# Patient Record
Sex: MALE | Race: WHITE | NOT HISPANIC OR LATINO | Employment: OTHER | URBAN - METROPOLITAN AREA
[De-identification: names, ages, dates, MRNs, and addresses within clinical notes are randomized per-mention and may not be internally consistent; named-entity substitution may affect disease eponyms.]

---

## 2018-03-14 ENCOUNTER — HOSPITAL ENCOUNTER (OUTPATIENT)
Dept: CARDIOLOGY | Facility: CLINIC | Age: 78
Discharge: HOME | End: 2018-03-14
Attending: FAMILY MEDICINE

## 2018-03-14 DIAGNOSIS — R55 NEAR SYNCOPE: ICD-10-CM

## 2018-08-08 ENCOUNTER — RECORDS - HEALTHEAST (OUTPATIENT)
Dept: ADMINISTRATIVE | Facility: OTHER | Age: 78
End: 2018-08-08

## 2018-08-09 ENCOUNTER — RECORDS - HEALTHEAST (OUTPATIENT)
Dept: ADMINISTRATIVE | Facility: OTHER | Age: 78
End: 2018-08-09

## 2018-11-27 ENCOUNTER — SURGERY - HEALTHEAST (OUTPATIENT)
Dept: GASTROENTEROLOGY | Facility: HOSPITAL | Age: 78
End: 2018-11-27

## 2018-11-27 ASSESSMENT — MIFFLIN-ST. JEOR: SCORE: 1615.25

## 2019-03-18 ENCOUNTER — AMBULATORY - HEALTHEAST (OUTPATIENT)
Dept: CARDIOLOGY | Facility: CLINIC | Age: 79
End: 2019-03-18

## 2019-03-22 ENCOUNTER — AMBULATORY - HEALTHEAST (OUTPATIENT)
Dept: CARDIOLOGY | Facility: CLINIC | Age: 79
End: 2019-03-22

## 2019-03-27 ENCOUNTER — OFFICE VISIT - HEALTHEAST (OUTPATIENT)
Dept: CARDIOLOGY | Facility: CLINIC | Age: 79
End: 2019-03-27

## 2019-03-27 DIAGNOSIS — I25.10 CORONARY ARTERY DISEASE INVOLVING NATIVE CORONARY ARTERY OF NATIVE HEART WITHOUT ANGINA PECTORIS: ICD-10-CM

## 2019-03-27 DIAGNOSIS — I48.0 PAROXYSMAL ATRIAL FIBRILLATION (H): ICD-10-CM

## 2019-03-27 DIAGNOSIS — I49.3 PVC'S (PREMATURE VENTRICULAR CONTRACTIONS): ICD-10-CM

## 2019-03-27 DIAGNOSIS — I10 BENIGN ESSENTIAL HYPERTENSION: ICD-10-CM

## 2019-03-27 DIAGNOSIS — R07.9 CHEST PAIN, UNSPECIFIED TYPE: ICD-10-CM

## 2019-03-27 LAB
ATRIAL RATE - MUSE: 66 BPM
DIASTOLIC BLOOD PRESSURE - MUSE: NORMAL MMHG
INTERPRETATION ECG - MUSE: NORMAL
P AXIS - MUSE: 55 DEGREES
PR INTERVAL - MUSE: 160 MS
QRS DURATION - MUSE: 84 MS
QT - MUSE: 378 MS
QTC - MUSE: 396 MS
R AXIS - MUSE: -34 DEGREES
SYSTOLIC BLOOD PRESSURE - MUSE: NORMAL MMHG
T AXIS - MUSE: 14 DEGREES
VENTRICULAR RATE- MUSE: 66 BPM

## 2019-03-27 RX ORDER — ATORVASTATIN CALCIUM 40 MG/1
40 TABLET, FILM COATED ORAL DAILY
Refills: 3 | Status: SHIPPED | COMMUNITY
Start: 2019-01-07 | End: 2021-09-15

## 2019-03-27 ASSESSMENT — MIFFLIN-ST. JEOR: SCORE: 1669.69

## 2021-02-08 ENCOUNTER — AMBULATORY - HEALTHEAST (OUTPATIENT)
Dept: ADMINISTRATIVE | Facility: REHABILITATION | Age: 81
End: 2021-02-08

## 2021-02-08 DIAGNOSIS — I21.21 ST ELEVATION MYOCARDIAL INFARCTION INVOLVING LEFT CIRCUMFLEX CORONARY ARTERY (H): ICD-10-CM

## 2021-04-22 ENCOUNTER — AMBULATORY - HEALTHEAST (OUTPATIENT)
Dept: ADMINISTRATIVE | Facility: REHABILITATION | Age: 81
End: 2021-04-22

## 2021-04-22 DIAGNOSIS — I10 ESSENTIAL HYPERTENSION, BENIGN: ICD-10-CM

## 2021-04-22 DIAGNOSIS — Z95.5 STENTED CORONARY ARTERY: ICD-10-CM

## 2021-04-22 DIAGNOSIS — I25.2 OLD MYOCARDIAL INFARCTION: ICD-10-CM

## 2021-04-22 DIAGNOSIS — I25.10 CORONARY ARTERY DISEASE INVOLVING NATIVE CORONARY ARTERY OF NATIVE HEART WITHOUT ANGINA PECTORIS: ICD-10-CM

## 2021-04-22 DIAGNOSIS — F03.90 DEMENTIA WITHOUT BEHAVIORAL DISTURBANCE, UNSPECIFIED DEMENTIA TYPE: ICD-10-CM

## 2021-05-27 NOTE — PATIENT INSTRUCTIONS - HE
Below is a list of instructions we discussed today in clinic:   1. The pain in the muscle on your left chest wall is not coming from your heart.   2. Dr. Smith is doing a great job taking care of your heart.  3. Continue all medications as prescribed.  4. Follow up with Dr. Smith as scheduled.    It was a pleasure to meet with you today in clinic.  Please do not hesitate to call the [facility] clinic with any questions or concerns at [phone].    Sincerely,     Xavier Masters MD  Non-invasive Cardiology  Formerly [facility]

## 2021-06-02 VITALS — BODY MASS INDEX: 30.36 KG/M2 | HEIGHT: 69 IN | WEIGHT: 205 LBS

## 2021-06-02 VITALS — HEIGHT: 69 IN | BODY MASS INDEX: 32.14 KG/M2 | WEIGHT: 217 LBS

## 2021-06-16 PROBLEM — R55 PRE-SYNCOPE: Status: ACTIVE | Noted: 2018-03-05

## 2021-06-16 PROBLEM — R55 NEAR SYNCOPE: Status: ACTIVE | Noted: 2018-03-05

## 2021-06-27 NOTE — PROGRESS NOTES
Progress Notes by Xavier Masters MD at 3/27/2019 10:30 AM     Author: Xavier Masters MD Service: -- Author Type: Physician    Filed: 3/27/2019 11:21 AM Encounter Date: 3/27/2019 Status: Signed    : Xavier Masters MD (Physician)           Click to link to [facility]     [facility] HEART CARE NOTE    Thank you, Rolando Zee MD, for asking the [facility] team to see Mr. Patrick Green to evaluate Chest Pain.      Assessment/Recommendations   Assessment:    1. Musculoskeletal chest pain - not cardiac in etiology  2. Coronary artery disease - stable without angina.  3. Hypertension - stable  4. Frequent PVCs - treated with metoprolol. Stable.  5. Paroxysmal atrial fibrillation - no known recent recurrence    Plan:  1. I assured the patient that his chest pain is not anginal in nature. No additional cardiac testing is needed at this time.  2. He can follow up as previously recommended by and with Dr. Smith.    Primary Cardiologist: Dr. Smith ([facility]).         History of Present Illness   Mr. Patrick Green is a 79 y.o. male with a significant past history of coronary artery disease, hypertension, and hyperlipidemia who presents for evaluation of chest pain.     Mr. Green was originally diagnosed with coronary artery disease when he presented with a non-ST segment elevation MI in May 2014.  Angiography demonstrated multivessel coronary artery disease including 95% occlusion of the proximal circumflex, 80% occlusion of proximal ramus intermedius and 80% occlusion of the proximal LAD coronary arteries.  He underwent primary PCI of the circumflex and ramus coronary arteries and a staged PCI 2 months later of the proximal LAD.  He subsequently had recurrent chest pain and a stress test suggested significant ischemia of the inferior wall and ultimately the patient underwent PCI of the right coronary artery in October 2014.  His most recent ischemic evaluation was June 22 of 17  by a coronary angiogram that demonstrated widely patent stents and mild luminal irregularities elsewhere.  Patient also has a history of paroxysmal atrial fibrillation without known recurrence in quite some time.    He generally follows at the [facility] and was last seen in January of this year.  The patient presents today for chest pain.  He states that the pain is described as an ache it is located in his left axilla.  It is been present nearly constantly for about 6 months.  It is not exertional.  It is worse when he palpates the area of pain and squeezes the muscle where that pain is located.  He thinks it is muscular in nature but wanted a formal opinion to make sure it is not his heart.  He denies any anginal type pain and denies any exertional intolerance.    Other than noted above, Mr. Green denies any chest pain/pressure/tightness, shortness of breath at rest or with exertion, light headedness/dizziness, pre-syncope, syncope, lower extremity swelling, palpitations, paroxysmal nocturnal dyspnea (PND), or orthopnea.     Cardiac Problems and Cardiac Diagnostics     Most Recent Cardiac testing:  ECG dated 3/27/19 (personaly reviewed and interpreted): sinus rhythm, left axis deviation, occasional PVCs.    ECHO (report reviewed):   Echo results:   Results for orders placed during the hospital encounter of 03/05/18   Echo Complete [ECH10] 03/05/2018    Narrative   Left Ventricle: Normal left ventricular size and systolic function.The   calculated left ventricular ejection fraction is 65%. This represents a   normal ejection fraction. Borderline concentric hypertrophy noted. E/e' <   8, suggesting normal LV filling pressure.    The left ventricular wall motion is normal.    Right Ventricle: Normal right ventricular size and systolic function.   TAPSE is normal, which is consistent with normal right ventricular   systolic function.    Thoracic Aorta: The ascending aorta is mildly  dilated. No aneurysm   present.             Medications  Allergies   Current Outpatient Medications   Medication Sig Dispense Refill   ? acetaminophen (TYLENOL EXTRA STRENGTH) 500 MG tablet Take 1,000 mg by mouth every morning.      ? APPLE CIDER VINEGAR ORAL Take 1 tablet by mouth 2 (two) times a day.     ? aspirin 81 MG EC tablet Take 81 mg by mouth daily.     ? atorvastatin (LIPITOR) 40 MG tablet Take 40 mg by mouth daily.  3   ? DOCOSAHEXANOIC ACID/EPA (FISH OIL ORAL) Take 1,500 mg by mouth daily.     ? donepezil (ARICEPT) 10 MG tablet Take 10 mg by mouth every morning.      ? finasteride (PROSCAR) 5 mg tablet Take 5 mg by mouth daily.      ? lidocaine (LIDODERM) 5 % Remove & Discard patch within 12 hours or as directed by MD 15 patch 0   ? metoprolol succinate (TOPROL-XL) 25 MG Take 25 mg by mouth daily.      ? tamsulosin (FLOMAX) 0.4 mg Cp24 Take 0.4 mg by mouth daily after supper.      ? UNABLE TO FIND Take 1 tablet by mouth daily. Med Name: Honey, Garlic and Vinegar     ? vit C/E/Zn/coppr/lutein/zeaxan (PRESERVISION AREDS-2 ORAL) Take 1 tablet by mouth 2 (two) times a day.     ? atorvastatin (LIPITOR) 20 MG tablet Take 20 mg by mouth at bedtime.     ? coenzyme Q10 (COQ-10) 100 mg capsule Take 100 mg by mouth daily.      ? gluc-rakel-[facility]#[id number]-cathie-born 750-30-1,000-1 mg-mg-unit-mg Tab Take 1,500 mg by mouth daily.      ? memantine (NAMENDA) 10 MG tablet Take 10 mg by mouth daily.     ? nitroglycerin (NITROSTAT) 0.4 MG SL tablet Place 0.4 mg under the tongue.       No current facility-administered medications for this visit.       Allergies   Allergen Reactions   ? Diatrizoate Meglumine Other (See Comments) and Rash     Anaphylactic reaction during 5/17/14 coronary angiogram per patient    ? Simvastatin Other (See Comments)     Muscle weakness        Physical Examination Review of Systems   Vitals:    03/27/19 1032   BP: 106/68   Pulse: 68   Resp: 16     Body mass index is 32.05 kg/m .  Wt Readings from Last  3 Encounters:   03/27/19 217 lb (98.4 kg)   11/27/18 205 lb (93 kg)   03/06/18 210 lb 6.4 oz (95.4 kg)       General Appearance:   Pleasant male, appears stated age. no acute distress, overweight body habitus   ENT/Mouth: membranes moist, no apparent gingival bleeding.      EYES:  no scleral icterus, normal conjunctivae   Neck: no carotid bruits. No anterior cervical lymphadenopaty   Respiratory:   lungs are clear to auscultation, no rales or wheezing, equal chest wall expansion    Cardiovascular:   Regular rhythm, normal rate. Normal first and second heart sounds with no murmurs, rubs, or gallops; the carotid, radial and posterior tibial pulses are intact, Jugular venous pressure normal, no edema bilaterally    Abdomen/GI:  Soft, non-tender   Extremities: no cyanosis or clubbing   Skin: no xanthelasma, warm.    Heme/lymph/ Immunology No apparent bleeding noted.   Neurologic: Alert and oriented. normal gait, no tremors     Psychiatric: Pleasant, calm, appropriate affect.    A complete 10 system review of systems was performed and is negative except as mentioned in the HPI or below:  General: WNL  Eyes: WNL  Ears/Nose/Throat: WNL  Lungs: WNL  Heart: Chest Pain, Irregular Heartbeat  Stomach: WNL  Bladder: WNL  Muscle/Joints: WNL  Skin: WNL  Nervous System: WNL  Mental Health: WNL     Blood: WNL       Past History   Past Medical History:   Past Medical History:   Diagnosis Date   ? Abnormal nuclear stress test, low risk 12/9/2016    Small inferolateral ischemic defect per Dr. Blount; EF > 70%.   ? Acute non-ST elevation myocardial infarction (NSTEMI) (H) 5/17/2014   ? Chronic sinusitis 1/3/2008   ? Coronary artery disease due to calcified coronary lesion 2014    LAD and RCA stents on separate occasions at [place]   ? Dementia    ? Dyslipidemia, goal LDL below 70        Past Surgical History:   Past Surgical History:   Procedure Laterality Date   ? APPENDECTOMY     ? COLONOSCOPY N/A 11/27/2018    Procedure: COLONOSCOPY;   Surgeon: Kathryn Infante MD;  Location: [facility];  Service: Gastroenterology   ? CORONARY STENT PLACEMENT      LAD and RCA       Family History:   Family History   Problem Relation Age of Onset   ? Heart attack Father         Social History:   Social History     Socioeconomic History   ? Marital status:      Spouse name: Not on file   ? Number of children: Not on file   ? Years of education: Not on file   ? Highest education level: Not on file   Occupational History     Employer: RETIRED   Social Needs   ? Financial resource strain: Not on file   ? Food insecurity:     Worry: Not on file     Inability: Not on file   ? Transportation needs:     Medical: Not on file     Non-medical: Not on file   Tobacco Use   ? Smoking status: Former Smoker     Types: Cigarettes     Last attempt to quit: 2013     Years since quittin.2   ? Smokeless tobacco: Never Used   Substance and Sexual Activity   ? Alcohol use: Yes     Alcohol/week: 0.6 oz     Types: 1 Shots of liquor per week   ? Drug use: No     Comment: unable to assess   ? Sexual activity: Not on file   Lifestyle   ? Physical activity:     Days per week: Not on file     Minutes per session: Not on file   ? Stress: Not on file   Relationships   ? Social connections:     Talks on phone: Not on file     Gets together: Not on file     Attends [employer] service: Not on file     Active member of club or organization: Not on file     Attends meetings of clubs or organizations: Not on file     Relationship status: Not on file   ? Intimate partner violence:     Fear of current or ex partner: Not on file     Emotionally abused: Not on file     Physically abused: Not on file     Forced sexual activity: Not on file   Other Topics Concern   ? Not on file   Social History Narrative    He is from [place], [place], which is in the [facility] corner of [facility].              Lab Results    Chemistry/lipid CBC Cardiac Enzymes/BNP/TSH/INR   Lab Results   Component Value Date     CREATININE 1.15 03/05/2018    BUN 28 03/05/2018    K 4.4 03/05/2018     03/05/2018     (H) 03/05/2018    CO2 20 (L) 03/05/2018    Lab Results   Component Value Date    WBC 8.2 03/05/2018    HGB 11.8 (L) 03/05/2018    HCT 36.1 (L) 03/05/2018    MCV 85 03/05/2018     03/05/2018    Lab Results   Component Value Date    CKTOTAL 236 (H) 12/08/2016    CKMB 6 12/08/2016    TROPONINI <0.01 03/05/2018    BNP 20 03/05/2018    INR 1.05 03/05/2018          Xavier Masters MD  Non-invasive Cardiology  [facility]

## 2021-08-29 ENCOUNTER — HOSPITAL ENCOUNTER (EMERGENCY)
Facility: CLINIC | Age: 81
Discharge: HOME | End: 2021-08-29
Attending: EMERGENCY MEDICINE | Admitting: EMERGENCY MEDICINE
Payer: MEDICARE

## 2021-08-29 ENCOUNTER — APPOINTMENT (OUTPATIENT)
Dept: CT IMAGING | Facility: CLINIC | Age: 81
End: 2021-08-29
Attending: EMERGENCY MEDICINE
Payer: MEDICARE

## 2021-08-29 VITALS
WEIGHT: 196 LBS | HEART RATE: 53 BPM | DIASTOLIC BLOOD PRESSURE: 53 MMHG | TEMPERATURE: 97.6 F | SYSTOLIC BLOOD PRESSURE: 102 MMHG | OXYGEN SATURATION: 96 % | RESPIRATION RATE: 16 BRPM | BODY MASS INDEX: 28.94 KG/M2
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
## 2021-08-29 DIAGNOSIS — S80.812A ABRASION OF LEFT LOWER EXTREMITY, INITIAL ENCOUNTER: ICD-10-CM

## 2021-08-29 DIAGNOSIS — S09.90XA CLOSED HEAD INJURY, INITIAL ENCOUNTER: ICD-10-CM

## 2021-08-29 PROCEDURE — 70450 CT HEAD/BRAIN W/O DYE: CPT

## 2021-08-29 PROCEDURE — 99284 EMERGENCY DEPT VISIT MOD MDM: CPT | Mod: 25

## 2021-08-29 NOTE — ED PROVIDER NOTES
EMERGENCY DEPARTMENT ENCOUNTER      NAME: Patrick Green  AGE: 81 year old male  YOB: 1940  MRN: [id number]  EVALUATION DATE & TIME: 8/29/2021 12:11 PM    PCP: Rolando Slaughter    ED PROVIDER: Simi Tracy MD      Chief Complaint   Patient presents with     Fall         FINAL IMPRESSION:  1. Closed head injury, initial encounter    2. Fall, initial encounter    3. Abrasion of left lower extremity, initial encounter          ED COURSE & MEDICAL DECISION MAKING:    Pertinent Labs & Imaging studies reviewed. (See chart for details)  81 year old male with history of CAD, HLD, BPH, dementia who presents to the Emergency Department for evaluation of pain in his left lower leg and the left side of his face after he fell down a ladder.  Patient has small abrasion/contusion lateral to the left orbit.  He is on Plavix and will obtain neuroimaging given his age to rule out skull fracture, ICH.  No midline neck or back pain.  He has an abrasion to the left anterior shin but is able to ambulate without difficulty.  My suspicion for fracture here is low and does not warrant imaging.         12:13 PM  I evaluated the patient to gather history and perform initial exam. ED course and treatment plan was discussed.  PPE worn: eye protection, surgical mask.    1:06 PM  I reevaluated the patient and updated with results. Patient discharged after being provided with extensive anticipatory guidance and given return precautions. Patient was agreeable with the plan.     At the conclusion of the encounter I discussed the results of all of the tests and the disposition. The questions were answered. The patient or family acknowledged understanding and was agreeable with the care plan.      MEDICATIONS GIVEN IN THE EMERGENCY:  Medications - No data to display    NEW PRESCRIPTIONS STARTED AT TODAY'S ER VISIT  New Prescriptions    No medications on file           =================================================================    HPI    Patient information was obtained from: patient patient's son    Use of Intrepreter: N/A        Patrick Green is a 81 year old male with pertinent medical history of CAD s/p stent placement, dyslipidemia, dementia, atrial fibrillation who presents a fall.    Patient reports he was on a ladder when he fell from 6 ft bella and hit his left lower leg and the left side of his face. He did not lose consciousness. He does take Plavix regularly.     Denies nausea, vomiting, headache, neck pain, back pain. No other concerns or complaints at this time.       REVIEW OF SYSTEMS  Constitutional:  Denies fever, chills, weight loss or weakness, no loss of appetite, weakness,  Eyes:  No pain, diplopia, or vision loss,  HENT:  Denies sore throat or ear pain. No Dysphagia. Positive for contusion on left eye.  GI:  Denies abdominal pain, nausea, vomiting, or dark, bloody stools. No diarrhea  Musculoskeletal:  Denies any new muscle/joint pain, swelling or loss of function.  Skin:  Denies rash. Positive for abrasion on left lower leg.  Neurologic:  Denies headache, focal weakness or sensory changes, vertigo, seizure  All other systems negative unless noted in HPI.    PAST MEDICAL HISTORY:  Past Medical History:   Diagnosis Date     BPH (benign prostatic hyperplasia)      CAD (coronary artery disease)      Dementia (H)      HLD (hyperlipidemia)        PAST SURGICAL HISTORY:  Past Surgical History:   Procedure Laterality Date     APPENDECTOMY       COLONOSCOPY N/A 11/27/2018    Procedure: COLONOSCOPY;  Surgeon: Kathryn Infante MD;  Location: [facility];  Service: Gastroenterology     CORONARY STENT PLACEMENT  2014    LAD and RCA           CURRENT MEDICATIONS:    Prior to Admission Medications   Prescriptions Last Dose Informant Patient Reported? Taking?   APPLE CIDER VINEGAR ORAL   Yes No   Sig: [APPLE CIDER VINEGAR ORAL] Take 1 tablet by mouth 2 (two) times  a day.   DOCOSAHEXANOIC ACID/EPA (FISH OIL ORAL)   Yes No   Sig: [DOCOSAHEXANOIC ACID/EPA (FISH OIL ORAL)] Take 1,500 mg by mouth daily.   UNABLE TO FIND   Yes No   Sig: [UNABLE TO FIND] Take 1 tablet by mouth daily. Med Name: Honey, Garlic and Vinegar   acetaminophen (TYLENOL EXTRA STRENGTH) 500 MG tablet   Yes No   Sig: [ACETAMINOPHEN (TYLENOL EXTRA STRENGTH) 500 MG TABLET] Take 1,000 mg by mouth every morning.    aspirin 81 MG EC tablet   Yes No   Sig: [ASPIRIN 81 MG EC TABLET] Take 81 mg by mouth daily.   atorvastatin (LIPITOR) 20 MG tablet   Yes No   Sig: [ATORVASTATIN (LIPITOR) 20 MG TABLET] Take 20 mg by mouth at bedtime.   atorvastatin (LIPITOR) 40 MG tablet   Yes No   Sig: [ATORVASTATIN (LIPITOR) 40 MG TABLET] Take 40 mg by mouth daily.   coenzyme Q10 (COQ-10) 100 mg capsule   Yes No   Sig: [COENZYME Q10 (COQ-10) 100 MG CAPSULE] Take 100 mg by mouth daily.    donepezil (ARICEPT) 10 MG tablet   Yes No   Sig: [DONEPEZIL (ARICEPT) 10 MG TABLET] Take 10 mg by mouth every morning.    finasteride (PROSCAR) 5 mg tablet   Yes No   Sig: [FINASTERIDE (PROSCAR) 5 MG TABLET] Take 5 mg by mouth daily.    lidocaine (LIDODERM) 5 %   No No   Sig: [LIDOCAINE (LIDODERM) 5 %] Remove & Discard patch within 12 hours or as directed by MD   memantine (NAMENDA) 10 MG tablet   Yes No   Sig: [MEMANTINE (NAMENDA) 10 MG TABLET] Take 10 mg by mouth daily.   metoprolol succinate (TOPROL-XL) 25 MG   Yes No   Sig: [METOPROLOL SUCCINATE (TOPROL-XL) 25 MG] Take 25 mg by mouth daily.    nitroglycerin (NITROSTAT) 0.4 MG SL tablet   Yes No   Sig: [NITROGLYCERIN (NITROSTAT) 0.4 MG SL TABLET] Place 0.4 mg under the tongue.   tamsulosin (FLOMAX) 0.4 mg Cp24   Yes No   Sig: [TAMSULOSIN (FLOMAX) 0.4 MG CP24] Take 0.4 mg by mouth daily after supper.    vit C/E/Zn/coppr/lutein/zeaxan (PRESERVISION AREDS-2 ORAL)   Yes No   Sig: [VIT C/E/ZN/COPPR/LUTEIN/ZEAXAN (PRESERVISION AREDS-2 ORAL)] Take 1 tablet by mouth 2 (two) times a day.       Facility-Administered Medications: None       ALLERGIES:  Allergies   Allergen Reactions     Diatrizoate Meglumine [Diatrizoate] Other (See Comments) and Rash     Anaphylactic reaction during 14 coronary angiogram per patient      Simvastatin Other (See Comments)     Muscle weakness     Zocor [Simvastatin]        FAMILY HISTORY:  Family History   Problem Relation Age of Onset     Coronary Artery Disease Father        SOCIAL HISTORY:  Social History     Tobacco Use     Smoking status: Former Smoker     Types: Cigarettes     Quit date: 2013     Years since quittin.7     Smokeless tobacco: Never Used   Substance Use Topics     Alcohol use: Yes     Alcohol/week: 1.0 standard drinks     Drug use: No     Comment: Drug use: unable to assess        VITALS:  Patient Vitals for the past 24 hrs:   BP Temp Temp src Pulse Resp SpO2 Weight   21 1209 112/58 97  F (36.1  C) Temporal 75 16 98 % 88.9 kg (196 lb)       PHYSICAL EXAM    Primary Survey:  A: intact  B: no respiratory distress, clear to asculatation bilateraly  C: regular rate, rhythm.    D: [place] Coma Scale    Eyes Verbal Motor  6 Obeys commands    5 Normal Localizes to pain    4 Opens spontaneously Confused or disoriented Withdrawal to pain  3 Opens to voice Inappropriate words Abnormal flexion to pain  2 Opens to pain Incomprehensible Abnormal extension to pain  1 Does not open eyes No sound No movement    Score 4 5 6    Total = 15  E: No obvious external injuries    Secondary Survey:  General Appearance: Pleasant elderly male.  Head:  Normocephalic, atraumatic  Eyes:  PERRL, conjunctiva/corneas clear, EOM's intact, no orbital injury  ENT:  No obvious facial deformity.  No tenderness to palpation.  No epistaxis.  Extraocular movements are intact.  Small contusion on left lateral orbital. Normal dentition.  Normal bite.  No malocclusion.  No oral pharyngeal bleeding no dysphonia or difficulty swallowing, membranes are moist without pallor, TM  clear, there is no facial tenderness to palpation or deformity  Neck:  No midline cervical spine tenderness.  No paraspinal tenderness.  Supple, symmetrical, trachea midline, no stridor or dysphonia, no soft tissue swelling or tenderness  Chest:  No tenderness or deformity, no crepitus  Cardio:  Regular rate and rhythm, 2+ pulses symmetric in all extremities  Pulm:  Respirations unlabored, Clear to auscultation bilaterally  Back:  No midline tenderness to palpation of the thoracic or lumbar spine, no stepoffs or crepitus  Abdomen:  Soft, non-tender, non distended, no rebound or guarding  Extremities:  No obvious deformity, all joints palpated in place with full range of motion.  No tenderness or instability.  Patient is able to bear full weight, no tenderness over joints or extremities, no cyanosis or edema, full function and range of motion, pulses equal in all extremities, normal cap refill. Abrasion to left anterior shin.  Skin:  Skin color, texture, turgor normal, no rashes or lesions  Neuro:  Awake, alert, responsive to voice, follows commands, normal speech, No gross motor weakness or sensory loss, moves all extremities, baseline ambulation, GCS 15     RADIOLOGY/LAB:  Reviewed all pertinent imaging. Please see official radiology report.  All pertinent labs reviewed and interpreted.  Results for orders placed or performed during the hospital encounter of 08/29/21   Head CT w/o contrast    Impression    IMPRESSION:  1.  No CT evidence for acute intracranial process.  2.  Mild chronic microvascular ischemic changes as above.       The creation of this record is based on the scribe s observations of the work being performed by Simi Tracy MD and the provider s statements to them. It was created on his behalf by Barb Williamson, a trained medical scribe. This document has been checked and approved by the attending provider.    Simi Tracy MD  Emergency Medicine  [facility]  [facility] EMERGENCY ROOM  [facility] [address]  [phone]  Dept: [phone]     Simi Tracy MD  08/29/21 1319

## 2021-08-29 NOTE — ED TRIAGE NOTES
Patient is here with a fall of the ladder tipped and he hit his left lower leg and hit the left side of his face by his eye. He is plavix for stent placed Feb 2021.Trauma alert called.

## 2021-09-15 ENCOUNTER — HOSPITAL ENCOUNTER (INPATIENT)
Facility: CLINIC | Age: 81
LOS: 3 days | Discharge: HOME | DRG: 603 | End: 2021-09-18
Attending: EMERGENCY MEDICINE | Admitting: HOSPITALIST
Payer: MEDICARE

## 2021-09-15 ENCOUNTER — APPOINTMENT (OUTPATIENT)
Dept: CT IMAGING | Facility: CLINIC | Age: 81
DRG: 603 | End: 2021-09-15
Attending: EMERGENCY MEDICINE
Payer: MEDICARE

## 2021-09-15 DIAGNOSIS — S81.802A OPEN WOUND OF KNEE, LEG, AND ANKLE, LEFT, INITIAL ENCOUNTER: ICD-10-CM

## 2021-09-15 DIAGNOSIS — S81.002A OPEN WOUND OF KNEE, LEG, AND ANKLE, LEFT, INITIAL ENCOUNTER: ICD-10-CM

## 2021-09-15 DIAGNOSIS — L03.116 CELLULITIS OF LEFT LOWER EXTREMITY: ICD-10-CM

## 2021-09-15 DIAGNOSIS — S91.002A OPEN WOUND OF KNEE, LEG, AND ANKLE, LEFT, INITIAL ENCOUNTER: ICD-10-CM

## 2021-09-15 DIAGNOSIS — L03.116 CELLULITIS OF LEG, LEFT: Primary | ICD-10-CM

## 2021-09-15 LAB
ALBUMIN SERPL-MCNC: 3.4 G/DL (ref 3.5–5)
ALP SERPL-CCNC: 106 U/L (ref 45–120)
ALT SERPL W P-5'-P-CCNC: <9 U/L (ref 0–45)
ANION GAP SERPL CALCULATED.3IONS-SCNC: 7 MMOL/L (ref 5–18)
AST SERPL W P-5'-P-CCNC: 13 U/L (ref 0–40)
BASOPHILS # BLD AUTO: 0 10E3/UL (ref 0–0.2)
BASOPHILS NFR BLD AUTO: 1 %
BILIRUB SERPL-MCNC: 0.8 MG/DL (ref 0–1)
BUN SERPL-MCNC: 23 MG/DL (ref 8–28)
C REACTIVE PROTEIN LHE: 1.7 MG/DL (ref 0–0.8)
CALCIUM SERPL-MCNC: 8.5 MG/DL (ref 8.5–10.5)
CHLORIDE BLD-SCNC: 108 MMOL/L (ref 98–107)
CK SERPL-CCNC: 88 U/L (ref 30–190)
CO2 SERPL-SCNC: 24 MMOL/L (ref 22–31)
CREAT SERPL-MCNC: 1.08 MG/DL (ref 0.7–1.3)
EOSINOPHIL # BLD AUTO: 0.2 10E3/UL (ref 0–0.7)
EOSINOPHIL NFR BLD AUTO: 3 %
ERYTHROCYTE [DISTWIDTH] IN BLOOD BY AUTOMATED COUNT: 13.5 % (ref 10–15)
GFR SERPL CREATININE-BSD FRML MDRD: 63 ML/MIN/1.73M2
GLUCOSE BLD-MCNC: 94 MG/DL (ref 70–125)
HCT VFR BLD AUTO: 30.2 % (ref 40–53)
HGB BLD-MCNC: 9.6 G/DL (ref 13.3–17.7)
IMM GRANULOCYTES # BLD: 0 10E3/UL
IMM GRANULOCYTES NFR BLD: 0 %
LACTATE SERPL-SCNC: 0.7 MMOL/L (ref 0.7–2)
LYMPHOCYTES # BLD AUTO: 1.2 10E3/UL (ref 0.8–5.3)
LYMPHOCYTES NFR BLD AUTO: 19 %
MCH RBC QN AUTO: 29.9 PG (ref 26.5–33)
MCHC RBC AUTO-ENTMCNC: 31.8 G/DL (ref 31.5–36.5)
MCV RBC AUTO: 94 FL (ref 78–100)
MONOCYTES # BLD AUTO: 0.7 10E3/UL (ref 0–1.3)
MONOCYTES NFR BLD AUTO: 10 %
NEUTROPHILS # BLD AUTO: 4.4 10E3/UL (ref 1.6–8.3)
NEUTROPHILS NFR BLD AUTO: 67 %
NRBC # BLD AUTO: 0 10E3/UL
NRBC BLD AUTO-RTO: 0 /100
PLATELET # BLD AUTO: 229 10E3/UL (ref 150–450)
POTASSIUM BLD-SCNC: 4.3 MMOL/L (ref 3.5–5)
PROT SERPL-MCNC: 6.3 G/DL (ref 6–8)
RBC # BLD AUTO: 3.21 10E6/UL (ref 4.4–5.9)
SARS-COV-2 RNA RESP QL NAA+PROBE: NEGATIVE
SODIUM SERPL-SCNC: 139 MMOL/L (ref 136–145)
WBC # BLD AUTO: 6.5 10E3/UL (ref 4–11)

## 2021-09-15 PROCEDURE — 250N000011 HC RX IP 250 OP 636: Performed by: EMERGENCY MEDICINE

## 2021-09-15 PROCEDURE — 87040 BLOOD CULTURE FOR BACTERIA: CPT | Performed by: EMERGENCY MEDICINE

## 2021-09-15 PROCEDURE — U0003 INFECTIOUS AGENT DETECTION BY NUCLEIC ACID (DNA OR RNA); SEVERE ACUTE RESPIRATORY SYNDROME CORONAVIRUS 2 (SARS-COV-2) (CORONAVIRUS DISEASE [COVID-19]), AMPLIFIED PROBE TECHNIQUE, MAKING USE OF HIGH THROUGHPUT TECHNOLOGIES AS DESCRIBED BY CMS-2020-01-R: HCPCS | Performed by: EMERGENCY MEDICINE

## 2021-09-15 PROCEDURE — 73700 CT LOWER EXTREMITY W/O DYE: CPT | Mod: LT

## 2021-09-15 PROCEDURE — 96365 THER/PROPH/DIAG IV INF INIT: CPT

## 2021-09-15 PROCEDURE — 83605 ASSAY OF LACTIC ACID: CPT | Performed by: EMERGENCY MEDICINE

## 2021-09-15 PROCEDURE — 85004 AUTOMATED DIFF WBC COUNT: CPT | Performed by: EMERGENCY MEDICINE

## 2021-09-15 PROCEDURE — 99285 EMERGENCY DEPT VISIT HI MDM: CPT | Mod: 25

## 2021-09-15 PROCEDURE — 258N000003 HC RX IP 258 OP 636: Performed by: EMERGENCY MEDICINE

## 2021-09-15 PROCEDURE — 99223 1ST HOSP IP/OBS HIGH 75: CPT | Performed by: HOSPITALIST

## 2021-09-15 PROCEDURE — 86141 C-REACTIVE PROTEIN HS: CPT | Performed by: EMERGENCY MEDICINE

## 2021-09-15 PROCEDURE — 96366 THER/PROPH/DIAG IV INF ADDON: CPT

## 2021-09-15 PROCEDURE — 96367 TX/PROPH/DG ADDL SEQ IV INF: CPT

## 2021-09-15 PROCEDURE — 80053 COMPREHEN METABOLIC PANEL: CPT | Performed by: EMERGENCY MEDICINE

## 2021-09-15 PROCEDURE — 120N000001 HC R&B MED SURG/OB

## 2021-09-15 PROCEDURE — 36415 COLL VENOUS BLD VENIPUNCTURE: CPT | Performed by: EMERGENCY MEDICINE

## 2021-09-15 PROCEDURE — 250N000011 HC RX IP 250 OP 636: Performed by: HOSPITALIST

## 2021-09-15 PROCEDURE — 250N000013 HC RX MED GY IP 250 OP 250 PS 637: Performed by: HOSPITALIST

## 2021-09-15 PROCEDURE — 82550 ASSAY OF CK (CPK): CPT | Performed by: HOSPITALIST

## 2021-09-15 RX ORDER — MEMANTINE HYDROCHLORIDE 10 MG/1
10 TABLET ORAL 2 TIMES DAILY
Status: DISCONTINUED | OUTPATIENT
Start: 2021-09-15 | End: 2021-09-18 | Stop reason: HOSPADM

## 2021-09-15 RX ORDER — METOPROLOL SUCCINATE 25 MG/1
25 TABLET, EXTENDED RELEASE ORAL DAILY
Status: DISCONTINUED | OUTPATIENT
Start: 2021-09-16 | End: 2021-09-18 | Stop reason: HOSPADM

## 2021-09-15 RX ORDER — ACETAMINOPHEN 500 MG
500-1000 TABLET ORAL EVERY 6 HOURS PRN
Status: DISCONTINUED | OUTPATIENT
Start: 2021-09-15 | End: 2021-09-18 | Stop reason: HOSPADM

## 2021-09-15 RX ORDER — DONEPEZIL HYDROCHLORIDE 5 MG/1
10 TABLET, FILM COATED ORAL EVERY MORNING
Status: DISCONTINUED | OUTPATIENT
Start: 2021-09-16 | End: 2021-09-18 | Stop reason: HOSPADM

## 2021-09-15 RX ORDER — ASPIRIN 81 MG/1
81 TABLET ORAL DAILY
Status: DISCONTINUED | OUTPATIENT
Start: 2021-09-16 | End: 2021-09-18 | Stop reason: HOSPADM

## 2021-09-15 RX ORDER — LIDOCAINE 4 G/G
1 PATCH TOPICAL DAILY PRN
Status: ON HOLD | COMMUNITY
End: 2021-09-18

## 2021-09-15 RX ORDER — CLINDAMYCIN PHOSPHATE 600 MG/50ML
600 INJECTION, SOLUTION INTRAVENOUS EVERY 8 HOURS
Status: DISCONTINUED | OUTPATIENT
Start: 2021-09-16 | End: 2021-09-17

## 2021-09-15 RX ORDER — ATORVASTATIN CALCIUM 80 MG/1
80 TABLET, FILM COATED ORAL AT BEDTIME
COMMUNITY

## 2021-09-15 RX ORDER — ATORVASTATIN CALCIUM 40 MG/1
80 TABLET, FILM COATED ORAL AT BEDTIME
Status: DISCONTINUED | OUTPATIENT
Start: 2021-09-15 | End: 2021-09-18 | Stop reason: HOSPADM

## 2021-09-15 RX ORDER — ONDANSETRON 2 MG/ML
4 INJECTION INTRAMUSCULAR; INTRAVENOUS EVERY 6 HOURS PRN
Status: DISCONTINUED | OUTPATIENT
Start: 2021-09-15 | End: 2021-09-18 | Stop reason: HOSPADM

## 2021-09-15 RX ORDER — NITROGLYCERIN 0.4 MG/1
0.4 TABLET SUBLINGUAL EVERY 5 MIN PRN
Status: DISCONTINUED | OUTPATIENT
Start: 2021-09-15 | End: 2021-09-18 | Stop reason: HOSPADM

## 2021-09-15 RX ORDER — LIDOCAINE 40 MG/G
CREAM TOPICAL
Status: DISCONTINUED | OUTPATIENT
Start: 2021-09-15 | End: 2021-09-18 | Stop reason: HOSPADM

## 2021-09-15 RX ORDER — TAMSULOSIN HYDROCHLORIDE 0.4 MG/1
0.4 CAPSULE ORAL
Status: DISCONTINUED | OUTPATIENT
Start: 2021-09-15 | End: 2021-09-18 | Stop reason: HOSPADM

## 2021-09-15 RX ORDER — LISINOPRIL 5 MG/1
5 TABLET ORAL DAILY
COMMUNITY

## 2021-09-15 RX ORDER — PIPERACILLIN SODIUM, TAZOBACTAM SODIUM 3; .375 G/15ML; G/15ML
3.38 INJECTION, POWDER, LYOPHILIZED, FOR SOLUTION INTRAVENOUS EVERY 8 HOURS
Status: DISCONTINUED | OUTPATIENT
Start: 2021-09-15 | End: 2021-09-18 | Stop reason: HOSPADM

## 2021-09-15 RX ORDER — PROCHLORPERAZINE 25 MG
12.5 SUPPOSITORY, RECTAL RECTAL EVERY 12 HOURS PRN
Status: DISCONTINUED | OUTPATIENT
Start: 2021-09-15 | End: 2021-09-18 | Stop reason: HOSPADM

## 2021-09-15 RX ORDER — PIPERACILLIN SODIUM, TAZOBACTAM SODIUM 3; .375 G/15ML; G/15ML
3.38 INJECTION, POWDER, LYOPHILIZED, FOR SOLUTION INTRAVENOUS ONCE
Status: COMPLETED | OUTPATIENT
Start: 2021-09-15 | End: 2021-09-15

## 2021-09-15 RX ORDER — LISINOPRIL 5 MG/1
5 TABLET ORAL DAILY
Status: DISCONTINUED | OUTPATIENT
Start: 2021-09-16 | End: 2021-09-18 | Stop reason: HOSPADM

## 2021-09-15 RX ORDER — ONDANSETRON 4 MG/1
4 TABLET, ORALLY DISINTEGRATING ORAL EVERY 6 HOURS PRN
Status: DISCONTINUED | OUTPATIENT
Start: 2021-09-15 | End: 2021-09-18 | Stop reason: HOSPADM

## 2021-09-15 RX ORDER — CEPHALEXIN 500 MG/1
500 CAPSULE ORAL 4 TIMES DAILY
Status: ON HOLD | COMMUNITY
End: 2021-09-18

## 2021-09-15 RX ORDER — CLINDAMYCIN PHOSPHATE 900 MG/50ML
900 INJECTION, SOLUTION INTRAVENOUS ONCE
Status: COMPLETED | OUTPATIENT
Start: 2021-09-15 | End: 2021-09-15

## 2021-09-15 RX ORDER — CLOPIDOGREL BISULFATE 75 MG/1
75 TABLET ORAL AT BEDTIME
Status: ON HOLD | COMMUNITY
End: 2021-09-18

## 2021-09-15 RX ORDER — FINASTERIDE 5 MG/1
5 TABLET, FILM COATED ORAL DAILY
Status: DISCONTINUED | OUTPATIENT
Start: 2021-09-16 | End: 2021-09-18 | Stop reason: HOSPADM

## 2021-09-15 RX ORDER — PROCHLORPERAZINE MALEATE 5 MG
5 TABLET ORAL EVERY 6 HOURS PRN
Status: DISCONTINUED | OUTPATIENT
Start: 2021-09-15 | End: 2021-09-18 | Stop reason: HOSPADM

## 2021-09-15 RX ORDER — CLOPIDOGREL BISULFATE 75 MG/1
75 TABLET ORAL AT BEDTIME
Status: DISCONTINUED | OUTPATIENT
Start: 2021-09-15 | End: 2021-09-18 | Stop reason: HOSPADM

## 2021-09-15 RX ADMIN — PIPERACILLIN AND TAZOBACTAM 3.38 G: 3; .375 INJECTION, POWDER, LYOPHILIZED, FOR SOLUTION INTRAVENOUS at 22:51

## 2021-09-15 RX ADMIN — CLINDAMYCIN PHOSPHATE 900 MG: 900 INJECTION, SOLUTION INTRAVENOUS at 17:57

## 2021-09-15 RX ADMIN — TAMSULOSIN HYDROCHLORIDE 0.4 MG: 0.4 CAPSULE ORAL at 22:51

## 2021-09-15 RX ADMIN — MEMANTINE HYDROCHLORIDE 10 MG: 10 TABLET, FILM COATED ORAL at 22:51

## 2021-09-15 RX ADMIN — CLOPIDOGREL BISULFATE 75 MG: 75 TABLET, FILM COATED ORAL at 22:51

## 2021-09-15 RX ADMIN — ATORVASTATIN CALCIUM 80 MG: 40 TABLET, FILM COATED ORAL at 22:51

## 2021-09-15 RX ADMIN — VANCOMYCIN HYDROCHLORIDE 1750 MG: 5 INJECTION, POWDER, LYOPHILIZED, FOR SOLUTION INTRAVENOUS at 19:54

## 2021-09-15 RX ADMIN — PIPERACILLIN AND TAZOBACTAM 3.38 G: 3; .375 INJECTION, POWDER, LYOPHILIZED, FOR SOLUTION INTRAVENOUS at 17:34

## 2021-09-15 ASSESSMENT — ENCOUNTER SYMPTOMS
FEVER: 0
BRUISES/BLEEDS EASILY: 1
WOUND: 1
CONFUSION: 0
MYALGIAS: 1

## 2021-09-15 ASSESSMENT — ACTIVITIES OF DAILY LIVING (ADL): DEPENDENT_IADLS:: TRANSPORTATION

## 2021-09-15 NOTE — PHARMACY-VANCOMYCIN DOSING SERVICE
Pharmacy Vancomycin Initial Note  Date of Service September 15, 2021  Patient's  1940  81 year old, male    Indication: Skin and Soft Tissue Infection    Current estimated CrCl = CrCl cannot be calculated (Patient's most recent lab result is older than the maximum 30 days allowed.).    Creatinine for last 3 days  No results found for requested labs within last 72 hours.    Recent Vancomycin Level(s) for last 3 days  No results found for requested labs within last 72 hours.      Vancomycin IV Administrations (past 72 hours)      No vancomycin orders with administrations in past 72 hours.                Nephrotoxins and other renal medications (From now, onward)    Start     Dose/Rate Route Frequency Ordered Stop    09/15/21 1700  piperacillin-tazobactam (ZOSYN) 3.375 g vial to attach to  mL bag      3.375 g  over 30 Minutes Intravenous ONCE 09/15/21 1645      09/15/21 1700  vancomycin 1750 mg in 0.9% NaCl 500 ml intermittent infusion 1,750 mg      1,750 mg  over 2 Hours Intravenous ONCE 09/15/21 1655            Contrast Orders - past 72 hours (72h ago, onward)    None                  Plan:  1. Start vancomycin  1750 mg (20mg/kg) IV x1 in ER  2. Please order new pharmacy to dose vancomycin if therapy is to be continued inpatient.      Andreas Bowles [facility]

## 2021-09-15 NOTE — ED PROVIDER NOTES
EMERGENCY DEPARTMENT ENCOUNTER      NAME: Patrick Green  AGE: 81 year old male  YOB: 1940  MRN: [id number]  EVALUATION DATE & TIME: 9/15/2021  3:56 PM    PCP: Rolando Slaughter        Chief Complaint   Patient presents with     Cellulitis         FINAL IMPRESSION:  1. Cellulitis of left lower extremity          ED COURSE & MEDICAL DECISION MAKING:    Pertinent Labs & Imaging studies reviewed. (See chart for details)  81 year old male presents to the Emergency Department for evaluation of foul-smelling red draining left leg with black scabs.     Per chart review black scabs been old since Monday developed redness since then.  He is well-appearing    Differential includes cellulitis, abscess, necrotizing fasciitis    Plan for CT without contrast, broad-spectrum antibiotics, admission    I reviewed labs.  Low risk for necrotizing fasciitis.  Discussed with general surgery and we both agree this is unlikely necrotizing fasciitis.  May need debridement later on.  Discussed with hospitalist who agrees admit patient    ED Course as of Sep 15 1857   Wed Sep 15, 2021   1824 Based on history and exam and pictures from today and Monday necrotizing fasciitis unlikely.  We will treat with broad-spectrum antibiotics.  Will obtain CT to evaluate for surgical abscess.  Not septic.  Plan for admission      1827 Spoke with the hospitalist who agrees with plan      1827 CRP(!): 1.7   1827 WBC: 6.5   1827 Hemoglobin(!): 9.6   1827 Platelet Count: 229   1827 Sodium: 139   1827 Potassium: 4.3   1827 Creatinine: 1.08   1827 LRINEC low risk for nec fas and stability on exam makes nec fas unlikely   Lactic Acid: 0.7   1850 I spoke with Dr. Benton, General Surgery recommends dry kerlix and acewrap.             At the conclusion of the encounter I discussed the results of all of the tests and the disposition. The questions were answered. The patient or family acknowledged understanding and was agreeable with the care plan.  "        4:43 PM I met with the patient, obtained history, performed an initial exam, and discussed options and plan for diagnostics and treatment here in the ED. I wore the following PPE: N95 and goggles.    MEDICATIONS GIVEN IN THE EMERGENCY:  Medications   clindamycin (CLEOCIN) infusion 900 mg (900 mg Intravenous New Bag 9/15/21 1757)   vancomycin 1750 mg in 0.9% NaCl 500 ml intermittent infusion 1,750 mg (has no administration in time range)   piperacillin-tazobactam (ZOSYN) 3.375 g vial to attach to  mL bag (0 g Intravenous Stopped 9/15/21 1756)       NEW PRESCRIPTIONS STARTED AT TODAY'S ER VISIT  New Prescriptions    No medications on file            =================================================================    HPI    Patient information was obtained from: Patient    Use of Intrepreter: N/A       Patrick Green is a 81 year old male with a pertinent history of dementia, A-fib, CAD, dyslipidemia, and s/p coronary stent placement,  who presents to this ED via walk-in for evaluation of cellulitis.     Patient reports infection on left shin that started about 3-4 days ago. He notes redness and swelling and malodor to infection, however reports that swelling has improved. Patient also reports that skin on left leg has started to fall off but denies any worsening to \"black section\" on shin. Patient was seen at [facility] yesterday and was started on Keflex and was recommended to be seen if infection worsened.     No other complaints at this time.     REVIEW OF SYSTEMS   Review of Systems   Constitutional: Negative for fever.   Musculoskeletal: Positive for myalgias.   Skin: Positive for rash and wound.        Positive for infection, swelling, and redness, on right shin.   Allergic/Immunologic: Negative for immunocompromised state.   Hematological: Bruises/bleeds easily.   Psychiatric/Behavioral: Negative for confusion.   All other systems reviewed and are negative.         PAST MEDICAL HISTORY:  Past " Medical History:   Diagnosis Date     BPH (benign prostatic hyperplasia)      CAD (coronary artery disease)      Dementia (H)      HLD (hyperlipidemia)        PAST SURGICAL HISTORY:  Past Surgical History:   Procedure Laterality Date     APPENDECTOMY       COLONOSCOPY N/A 11/27/2018    Procedure: COLONOSCOPY;  Surgeon: Kathryn Infante MD;  Location: [facility];  Service: Gastroenterology     CORONARY STENT PLACEMENT  2014    LAD and RCA           CURRENT MEDICATIONS:    Patient's Medications   New Prescriptions    No medications on file   Previous Medications    ACETAMINOPHEN (TYLENOL EXTRA STRENGTH) 500 MG TABLET    Take 500-1,000 mg by mouth every 6 hours as needed     ASPIRIN 81 MG EC TABLET    [ASPIRIN 81 MG EC TABLET] Take 81 mg by mouth daily.    ATORVASTATIN (LIPITOR) 80 MG TABLET    Take 80 mg by mouth At Bedtime    CEPHALEXIN (KEFLEX) 500 MG CAPSULE    Take 500 mg by mouth 4 times daily    CLOPIDOGREL (PLAVIX) 75 MG TABLET    Take 75 mg by mouth At Bedtime    DOCOSAHEXANOIC ACID/EPA (FISH OIL ORAL)    [DOCOSAHEXANOIC ACID/EPA (FISH OIL ORAL)] Take 1,500 mg by mouth daily.    DONEPEZIL (ARICEPT) 10 MG TABLET    [DONEPEZIL (ARICEPT) 10 MG TABLET] Take 10 mg by mouth every morning.     FINASTERIDE (PROSCAR) 5 MG TABLET    [FINASTERIDE (PROSCAR) 5 MG TABLET] Take 5 mg by mouth daily.     LIDOCAINE (LIDOCARE) 4 % PATCH    Place 1 patch onto the skin daily as needed for moderate pain To prevent lidocaine toxicity, patient should be patch free for 12 hrs daily.    LISINOPRIL (ZESTRIL) 5 MG TABLET    Take 5 mg by mouth daily    MEMANTINE (NAMENDA) 10 MG TABLET    Take 10 mg by mouth 2 times daily     METOPROLOL SUCCINATE (TOPROL-XL) 25 MG    [METOPROLOL SUCCINATE (TOPROL-XL) 25 MG] Take 25 mg by mouth daily.     NITROGLYCERIN (NITROSTAT) 0.4 MG SL TABLET    [NITROGLYCERIN (NITROSTAT) 0.4 MG SL TABLET] Place 0.4 mg under the tongue.    TAMSULOSIN (FLOMAX) 0.4 MG CP24    [TAMSULOSIN (FLOMAX) 0.4 MG CP24] Take 0.4 mg  by mouth daily after supper.     VIT C/E/ZN/COPPR/LUTEIN/ZEAXAN (PRESERVISION AREDS-2 ORAL)    [VIT C/E/ZN/COPPR/LUTEIN/ZEAXAN (PRESERVISION AREDS-2 ORAL)] Take 1 tablet by mouth 2 (two) times a day.   Modified Medications    No medications on file   Discontinued Medications    APPLE CIDER VINEGAR ORAL    [APPLE CIDER VINEGAR ORAL] Take 1 tablet by mouth 2 (two) times a day.    ATORVASTATIN (LIPITOR) 20 MG TABLET    [ATORVASTATIN (LIPITOR) 20 MG TABLET] Take 20 mg by mouth at bedtime.    ATORVASTATIN (LIPITOR) 40 MG TABLET    [ATORVASTATIN (LIPITOR) 40 MG TABLET] Take 40 mg by mouth daily.    COENZYME Q10 (COQ-10) 100 MG CAPSULE    [COENZYME Q10 (COQ-10) 100 MG CAPSULE] Take 100 mg by mouth daily.     LIDOCAINE (LIDODERM) 5 %    [LIDOCAINE (LIDODERM) 5 %] Remove & Discard patch within 12 hours or as directed by MD    UNABLE TO FIND    [UNABLE TO FIND] Take 1 tablet by mouth daily. Med Name: Honey, Garlic and Vinegar       ALLERGIES:  Allergies   Allergen Reactions     Diatrizoate Other (See Comments) and Rash     Anaphylactic reaction during 14 coronary angiogram per patient   Other reaction(s): Hypotension  Anaphylactic reaction during 14 coronary angiogram per patient   Anaphylactic reaction during 14 coronary angiogram per patient        Simvastatin Other (See Comments)     Muscle weakness  Other reaction(s): Muscle Weakness     Zocor [Simvastatin]        FAMILY HISTORY:  Family History   Problem Relation Age of Onset     Coronary Artery Disease Father        SOCIAL HISTORY:   Social History     Socioeconomic History     Marital status:      Spouse name: Not on file     Number of children: Not on file     Years of education: Not on file     Highest education level: Not on file   Occupational History     Not on file   Tobacco Use     Smoking status: Former Smoker     Types: Cigarettes     Quit date: 2013     Years since quittin.7     Smokeless tobacco: Never Used   Substance and  Sexual Activity     Alcohol use: Yes     Alcohol/week: 1.0 standard drinks     Drug use: No     Comment: Drug use: unable to assess     Sexual activity: Not on file   Other Topics Concern     Not on file   Social History Narrative    He is from [place], [place], which [place] the [place] [facility].     Social Determinants of Health     Financial Resource Strain:      Difficulty of Paying Living Expenses:    Food Insecurity:      Worried About Running Out of Food in the Last Year:      Ran Out of Food in the Last Year:    Transportation Needs:      Lack of Transportation (Medical):      Lack of Transportation (Non-Medical):    Physical Activity:      Days of Exercise per Week:      Minutes of Exercise per Session:    Stress:      Feeling of Stress :    Social Connections:      Frequency of Communication with Friends and Family:      Frequency of Social Gatherings with Friends and Family:      Attends Yarsanism Services:      Active Member of Clubs or Organizations:      Attends Club or Organization Meetings:      Marital Status:    Intimate Partner Violence:      Fear of Current or Ex-Partner:      Emotionally Abused:      Physically Abused:      Sexually Abused:        VITALS:  Patient Vitals for the past 24 hrs:   BP Temp Temp src Pulse Resp SpO2 Weight   09/15/21 1554 117/60 97.3  F (36.3  C) Temporal 62 16 97 % 88.5 kg (195 lb)       PHYSICAL EXAM      Vitals: /60   Pulse 62   Temp 97.3  F (36.3  C) (Temporal)   Resp 16   Wt 88.5 kg (195 lb)   SpO2 97%   BMI 28.80 kg/m    General: Appears in no acute distress, awake, alert, interactive.  Eyes: Conjunctivae non-injected. Sclera anicteric.  HENT: Atraumatic.  Neck: Supple.  Respiratory/Chest: Respiration unlabored.  Heart: RRR  Abdomen: non distended  MusculoskeletalL foul-smelling, edematous, erythematous, black eschar to left lower extremity.  No crepitus.  Compartments soft  Skin: See rash picture before  Neurologic: Face symmetric, moves all  extremities spontaneously. Speech clear.  Psychiatric: Oriented to person, place, and time. Affect appropriate.      Picture taken today, 9/15/21.         Picture taken 9/11/21, four days ago.         LAB:  All pertinent labs reviewed and interpreted.  Results for orders placed or performed during the hospital encounter of 09/15/21   CRP inflammation   Result Value Ref Range    CRP 1.7 (H) 0.0-<0.8 mg/dL   Comprehensive metabolic panel   Result Value Ref Range    Sodium 139 136 - 145 mmol/L    Potassium 4.3 3.5 - 5.0 mmol/L    Chloride 108 (H) 98 - 107 mmol/L    Carbon Dioxide (CO2) 24 22 - 31 mmol/L    Anion Gap 7 5 - 18 mmol/L    Urea Nitrogen 23 8 - 28 mg/dL    Creatinine 1.08 0.70 - 1.30 mg/dL    Calcium 8.5 8.5 - 10.5 mg/dL    Glucose 94 70 - 125 mg/dL    Alkaline Phosphatase 106 45 - 120 U/L    AST 13 0 - 40 U/L    ALT <9 0 - 45 U/L    Protein Total 6.3 6.0 - 8.0 g/dL    Albumin 3.4 (L) 3.5 - 5.0 g/dL    Bilirubin Total 0.8 0.0 - 1.0 mg/dL    GFR Estimate 63 >60 mL/min/1.73m2   Lactic acid whole blood   Result Value Ref Range    Lactic Acid 0.7 0.7 - 2.0 mmol/L   CBC with platelets and differential   Result Value Ref Range    WBC Count 6.5 4.0 - 11.0 10e3/uL    RBC Count 3.21 (L) 4.40 - 5.90 10e6/uL    Hemoglobin 9.6 (L) 13.3 - 17.7 g/dL    Hematocrit 30.2 (L) 40.0 - 53.0 %    MCV 94 78 - 100 fL    MCH 29.9 26.5 - 33.0 pg    MCHC 31.8 31.5 - 36.5 g/dL    RDW 13.5 10.0 - 15.0 %    Platelet Count 229 150 - 450 10e3/uL    % Neutrophils 67 %    % Lymphocytes 19 %    % Monocytes 10 %    % Eosinophils 3 %    % Basophils 1 %    % Immature Granulocytes 0 %    NRBCs per 100 WBC 0 <1 /100    Absolute Neutrophils 4.4 1.6 - 8.3 10e3/uL    Absolute Lymphocytes 1.2 0.8 - 5.3 10e3/uL    Absolute Monocytes 0.7 0.0 - 1.3 10e3/uL    Absolute Eosinophils 0.2 0.0 - 0.7 10e3/uL    Absolute Basophils 0.0 0.0 - 0.2 10e3/uL    Absolute Immature Granulocytes 0.0 <=0.0 10e3/uL    Absolute NRBCs 0.0 10e3/uL       RADIOLOGY:  Reviewed  all pertinent imaging. Please see official radiology report.  CT Tibia Fibula Lower Leg Left wo Contrast    (Results Pending)       PROCEDURES:         I, Ruba Valencia, am serving as a scribe to document services personally performed by Dr. Antonio based on my observation and the provider's statements to me. I, Kalpesh Antonio MD attest that Ruba Valencia is acting in a scribe capacity, has observed my performance of the services and has documented them in accordance with my direction.    Kalpesh Antonio M.D.  Emergency Medicine  [facility] EMERGENCY ROOM  [address] [facility] [id number] [phone]  Dept: [phone]     Willy Antonio MD  09/15/21 4121

## 2021-09-15 NOTE — PHARMACY-ADMISSION MEDICATION HISTORY
Pharmacy Note - Admission Medication History    Pertinent Provider Information:      ______________________________________________________________________    Prior To Admission (PTA) med list completed and updated in EMR.       PTA Med List   Medication Sig Note Last Dose     acetaminophen (TYLENOL EXTRA STRENGTH) 500 MG tablet Take 500-1,000 mg by mouth every 6 hours as needed   Past Week at Unknown time     aspirin 81 MG EC tablet [ASPIRIN 81 MG EC TABLET] Take 81 mg by mouth daily.  9/15/2021 at am     atorvastatin (LIPITOR) 80 MG tablet Take 80 mg by mouth At Bedtime  9/14/2021 at pm     cephALEXin (KEFLEX) 500 MG capsule Take 500 mg by mouth 4 times daily  9/15/2021 at am     clopidogrel (PLAVIX) 75 MG tablet Take 75 mg by mouth At Bedtime  9/14/2021 at pm     DOCOSAHEXANOIC ACID/EPA (FISH OIL ORAL) [DOCOSAHEXANOIC ACID/EPA (FISH OIL ORAL)] Take 1,500 mg by mouth daily.  9/15/2021 at Unknown time     donepezil (ARICEPT) 10 MG tablet [DONEPEZIL (ARICEPT) 10 MG TABLET] Take 10 mg by mouth every morning.   9/15/2021 at Unknown time     finasteride (PROSCAR) 5 mg tablet [FINASTERIDE (PROSCAR) 5 MG TABLET] Take 5 mg by mouth daily.   9/15/2021 at Unknown time     Lidocaine (LIDOCARE) 4 % Patch Place 1 patch onto the skin daily as needed for moderate pain To prevent lidocaine toxicity, patient should be patch free for 12 hrs daily. 9/15/2021: Patch currently OFF Past Week at Unknown time     lisinopril (ZESTRIL) 5 MG tablet Take 5 mg by mouth daily  9/15/2021 at am     memantine (NAMENDA) 10 MG tablet Take 10 mg by mouth 2 times daily   9/15/2021 at am     metoprolol succinate (TOPROL-XL) 25 MG [METOPROLOL SUCCINATE (TOPROL-XL) 25 MG] Take 25 mg by mouth daily.   9/15/2021 at am     nitroglycerin (NITROSTAT) 0.4 MG SL tablet [NITROGLYCERIN (NITROSTAT) 0.4 MG SL TABLET] Place 0.4 mg under the tongue.       tamsulosin (FLOMAX) 0.4 mg Cp24 [TAMSULOSIN (FLOMAX) 0.4 MG CP24] Take 0.4 mg by mouth daily after supper.    9/14/2021 at pm     vit C/E/Zn/coppr/lutein/zeaxan (PRESERVISION AREDS-2 ORAL) [VIT C/E/ZN/COPPR/LUTEIN/ZEAXAN (PRESERVISION AREDS-2 ORAL)] Take 1 tablet by mouth 2 (two) times a day.  9/15/2021 at am       Information source(s): Family member (ji Brady) and [employer]  Method of interview communication: phone    Summary of Changes to PTA Med List  New: Lisinopril, Plavix, cephalexin  Discontinued: Coq10, apple cider  Changed: atorvastatin to 80 mg, APAP to prn, memantine to BID, lidocaine patch to prn    Patient was asked about OTC/herbal products specifically.  PTA med list reflects this.    In the past week, patient estimated taking medication this percent of the time:  greater than 90%.    Allergies were reviewed, assessed, and updated with the patient.      Patient does not use any multi-dose medications prior to admission.    The information provided in this note is only as accurate as the sources available at the time of the update(s).    Thank you for the opportunity to participate in the care of this patient.    Andreas Bowles RPH  9/15/2021 6:08 PM

## 2021-09-16 LAB
ALBUMIN SERPL-MCNC: 3 G/DL (ref 3.5–5)
ALP SERPL-CCNC: 88 U/L (ref 45–120)
ALT SERPL W P-5'-P-CCNC: <9 U/L (ref 0–45)
ANION GAP SERPL CALCULATED.3IONS-SCNC: 5 MMOL/L (ref 5–18)
AST SERPL W P-5'-P-CCNC: 11 U/L (ref 0–40)
BASOPHILS # BLD AUTO: 0.1 10E3/UL (ref 0–0.2)
BASOPHILS NFR BLD AUTO: 1 %
BILIRUB DIRECT SERPL-MCNC: 0.3 MG/DL
BILIRUB SERPL-MCNC: 0.8 MG/DL (ref 0–1)
BUN SERPL-MCNC: 18 MG/DL (ref 8–28)
C REACTIVE PROTEIN LHE: 1.6 MG/DL (ref 0–0.8)
CALCIUM SERPL-MCNC: 8.2 MG/DL (ref 8.5–10.5)
CHLORIDE BLD-SCNC: 110 MMOL/L (ref 98–107)
CO2 SERPL-SCNC: 24 MMOL/L (ref 22–31)
CREAT SERPL-MCNC: 1.06 MG/DL (ref 0.7–1.3)
EOSINOPHIL # BLD AUTO: 0.2 10E3/UL (ref 0–0.7)
EOSINOPHIL NFR BLD AUTO: 4 %
ERYTHROCYTE [DISTWIDTH] IN BLOOD BY AUTOMATED COUNT: 13.4 % (ref 10–15)
GFR SERPL CREATININE-BSD FRML MDRD: 65 ML/MIN/1.73M2
GLUCOSE BLD-MCNC: 90 MG/DL (ref 70–125)
HCT VFR BLD AUTO: 27.7 % (ref 40–53)
HGB BLD-MCNC: 8.9 G/DL (ref 13.3–17.7)
IMM GRANULOCYTES # BLD: 0 10E3/UL
IMM GRANULOCYTES NFR BLD: 0 %
LYMPHOCYTES # BLD AUTO: 1.3 10E3/UL (ref 0.8–5.3)
LYMPHOCYTES NFR BLD AUTO: 23 %
MCH RBC QN AUTO: 29.9 PG (ref 26.5–33)
MCHC RBC AUTO-ENTMCNC: 32.1 G/DL (ref 31.5–36.5)
MCV RBC AUTO: 93 FL (ref 78–100)
MONOCYTES # BLD AUTO: 0.5 10E3/UL (ref 0–1.3)
MONOCYTES NFR BLD AUTO: 9 %
NEUTROPHILS # BLD AUTO: 3.6 10E3/UL (ref 1.6–8.3)
NEUTROPHILS NFR BLD AUTO: 63 %
NRBC # BLD AUTO: 0 10E3/UL
NRBC BLD AUTO-RTO: 0 /100
PLATELET # BLD AUTO: 194 10E3/UL (ref 150–450)
POTASSIUM BLD-SCNC: 4.3 MMOL/L (ref 3.5–5)
PROT SERPL-MCNC: 5.5 G/DL (ref 6–8)
RBC # BLD AUTO: 2.98 10E6/UL (ref 4.4–5.9)
SODIUM SERPL-SCNC: 139 MMOL/L (ref 136–145)
WBC # BLD AUTO: 5.7 10E3/UL (ref 4–11)

## 2021-09-16 PROCEDURE — 120N000001 HC R&B MED SURG/OB

## 2021-09-16 PROCEDURE — 36415 COLL VENOUS BLD VENIPUNCTURE: CPT | Performed by: HOSPITALIST

## 2021-09-16 PROCEDURE — 99232 SBSQ HOSP IP/OBS MODERATE 35: CPT | Performed by: HOSPITALIST

## 2021-09-16 PROCEDURE — 85025 COMPLETE CBC W/AUTO DIFF WBC: CPT | Performed by: HOSPITALIST

## 2021-09-16 PROCEDURE — 82374 ASSAY BLOOD CARBON DIOXIDE: CPT | Performed by: HOSPITALIST

## 2021-09-16 PROCEDURE — 250N000013 HC RX MED GY IP 250 OP 250 PS 637: Performed by: HOSPITALIST

## 2021-09-16 PROCEDURE — 82248 BILIRUBIN DIRECT: CPT | Performed by: HOSPITALIST

## 2021-09-16 PROCEDURE — 999N000127 HC STATISTIC PERIPHERAL IV START W US GUIDANCE

## 2021-09-16 PROCEDURE — 99222 1ST HOSP IP/OBS MODERATE 55: CPT | Performed by: PHYSICIAN ASSISTANT

## 2021-09-16 PROCEDURE — 258N000003 HC RX IP 258 OP 636: Performed by: HOSPITALIST

## 2021-09-16 PROCEDURE — G0463 HOSPITAL OUTPT CLINIC VISIT: HCPCS

## 2021-09-16 PROCEDURE — 86141 C-REACTIVE PROTEIN HS: CPT | Performed by: HOSPITALIST

## 2021-09-16 PROCEDURE — 250N000011 HC RX IP 250 OP 636: Performed by: HOSPITALIST

## 2021-09-16 RX ORDER — PANTOPRAZOLE SODIUM 20 MG/1
40 TABLET, DELAYED RELEASE ORAL
Status: DISCONTINUED | OUTPATIENT
Start: 2021-09-16 | End: 2021-09-18 | Stop reason: HOSPADM

## 2021-09-16 RX ADMIN — ACETAMINOPHEN 1000 MG: 500 TABLET, FILM COATED ORAL at 03:08

## 2021-09-16 RX ADMIN — METOPROLOL SUCCINATE 25 MG: 25 TABLET, EXTENDED RELEASE ORAL at 09:18

## 2021-09-16 RX ADMIN — DONEPEZIL HYDROCHLORIDE 10 MG: 5 TABLET, FILM COATED ORAL at 06:52

## 2021-09-16 RX ADMIN — ACETAMINOPHEN 1000 MG: 500 TABLET, FILM COATED ORAL at 11:00

## 2021-09-16 RX ADMIN — TAMSULOSIN HYDROCHLORIDE 0.4 MG: 0.4 CAPSULE ORAL at 18:12

## 2021-09-16 RX ADMIN — CLOPIDOGREL BISULFATE 75 MG: 75 TABLET, FILM COATED ORAL at 20:00

## 2021-09-16 RX ADMIN — ATORVASTATIN CALCIUM 80 MG: 40 TABLET, FILM COATED ORAL at 20:00

## 2021-09-16 RX ADMIN — CLINDAMYCIN PHOSPHATE 600 MG: 600 INJECTION, SOLUTION INTRAVENOUS at 03:02

## 2021-09-16 RX ADMIN — ASPIRIN 81 MG: 81 TABLET, DELAYED RELEASE ORAL at 09:18

## 2021-09-16 RX ADMIN — FINASTERIDE 5 MG: 5 TABLET, FILM COATED ORAL at 09:18

## 2021-09-16 RX ADMIN — MEMANTINE HYDROCHLORIDE 10 MG: 10 TABLET, FILM COATED ORAL at 09:18

## 2021-09-16 RX ADMIN — CLINDAMYCIN PHOSPHATE 600 MG: 600 INJECTION, SOLUTION INTRAVENOUS at 09:19

## 2021-09-16 RX ADMIN — VANCOMYCIN HYDROCHLORIDE 1500 MG: 5 INJECTION, POWDER, LYOPHILIZED, FOR SOLUTION INTRAVENOUS at 19:59

## 2021-09-16 RX ADMIN — PANTOPRAZOLE SODIUM 40 MG: 20 TABLET, DELAYED RELEASE ORAL at 07:31

## 2021-09-16 RX ADMIN — MEMANTINE HYDROCHLORIDE 10 MG: 10 TABLET, FILM COATED ORAL at 20:00

## 2021-09-16 RX ADMIN — PIPERACILLIN AND TAZOBACTAM 3.38 G: 3; .375 INJECTION, POWDER, LYOPHILIZED, FOR SOLUTION INTRAVENOUS at 06:52

## 2021-09-16 RX ADMIN — LISINOPRIL 5 MG: 5 TABLET ORAL at 09:18

## 2021-09-16 RX ADMIN — ACETAMINOPHEN 1000 MG: 500 TABLET, FILM COATED ORAL at 18:12

## 2021-09-16 RX ADMIN — PIPERACILLIN AND TAZOBACTAM 3.38 G: 3; .375 INJECTION, POWDER, LYOPHILIZED, FOR SOLUTION INTRAVENOUS at 21:28

## 2021-09-16 NOTE — CONSULTS
General Surgery Consultation  Patrick Green MRN# [id number]   Age/Sex: 81 year old male YOB: 1940     Reason for consult: 1. Cellulitis of left lower extremity    2. Open wound of knee, leg, and ankle, left, initial encounter            Requesting physician: Dr. Spivey                   Assessment and Plan:   Assessment:  Lower left leg wound with cellulitis    Plan:  Reviewed exam, lab, and imaging findings with him. It appears to be improving since admission. Would not recommend any surgical debridement at this time. [facility] RN has seen patient as well, appreciate their care. I think this will continue to improve with ABX and good wound care. Possibly would benefit from silver dressings.        Sami Dunlap PA-C  Pager - [phone]  Phone - [phone]   General Surgery           Chief Complaint:     Chief Complaint   Patient presents with     Cellulitis        History is obtained from the patient    HPI:   Patrick Green is a 81 year old male who presents left lower leg wound and infection. He says the symptoms started after falling off a ladder and sustaining an abrasion. He was apparently started on abx as outpatient, but things worsened and he was admitted. He currently states that his pain is improving overall.            Past Medical History:     Past Medical History:   Diagnosis Date     BPH (benign prostatic hyperplasia)      CAD (coronary artery disease)      Dementia (H)      HLD (hyperlipidemia)               Past Surgical History:     Past Surgical History:   Procedure Laterality Date     APPENDECTOMY       COLONOSCOPY N/A 11/27/2018    Procedure: COLONOSCOPY;  Surgeon: Kathryn Infante MD;  Location: [facility];  Service: Gastroenterology     CORONARY STENT PLACEMENT  2014    LAD and RCA             Social History:    reports that he quit smoking about 7 years ago. His smoking use included cigarettes. He has never used smokeless tobacco. He reports current alcohol use of about 1.0 standard  drinks of alcohol per week. He reports that he does not use drugs.           Family History:     Family History   Problem Relation Age of Onset     Coronary Artery Disease Father               Allergies:     Allergies   Allergen Reactions     Diatrizoate Other (See Comments) and Rash     Anaphylactic reaction during 5/17/14 coronary angiogram per patient   Other reaction(s): Hypotension  Anaphylactic reaction during 5/17/14 coronary angiogram per patient   Anaphylactic reaction during 5/17/14 coronary angiogram per patient        Simvastatin Other (See Comments)     Muscle weakness  Other reaction(s): Muscle Weakness     Zocor [Simvastatin]               Medications:     Prior to Admission medications    Medication Sig Start Date End Date Taking? Authorizing Provider   acetaminophen (TYLENOL EXTRA STRENGTH) 500 MG tablet Take 500-1,000 mg by mouth every 6 hours as needed  9/15/14  Yes Provider, Historical   aspirin 81 MG EC tablet [ASPIRIN 81 MG EC TABLET] Take 81 mg by mouth daily. 10/6/14  Yes Provider, Historical   atorvastatin (LIPITOR) 80 MG tablet Take 80 mg by mouth At Bedtime   Yes Unknown, Entered By History   cephALEXin (KEFLEX) 500 MG capsule Take 500 mg by mouth 4 times daily   Yes Unknown, Entered By History   clopidogrel (PLAVIX) 75 MG tablet Take 75 mg by mouth At Bedtime   Yes Unknown, Entered By History   DOCOSAHEXANOIC ACID/EPA (FISH OIL ORAL) [DOCOSAHEXANOIC ACID/EPA (FISH OIL ORAL)] Take 1,500 mg by mouth daily. 3/5/18  Yes Provider, Historical   donepezil (ARICEPT) 10 MG tablet [DONEPEZIL (ARICEPT) 10 MG TABLET] Take 10 mg by mouth every morning.  3/5/18  Yes Provider, Historical   finasteride (PROSCAR) 5 mg tablet [FINASTERIDE (PROSCAR) 5 MG TABLET] Take 5 mg by mouth daily.  8/18/15  Yes Provider, Historical   Lidocaine (LIDOCARE) 4 % Patch Place 1 patch onto the skin daily as needed for moderate pain To prevent lidocaine toxicity, patient should be patch free for 12 hrs daily.   Yes Unknown,  "Entered By History   lisinopril (ZESTRIL) 5 MG tablet Take 5 mg by mouth daily   Yes Unknown, Entered By History   memantine (NAMENDA) 10 MG tablet Take 10 mg by mouth 2 times daily  3/5/18  Yes Provider, Historical   metoprolol succinate (TOPROL-XL) 25 MG [METOPROLOL SUCCINATE (TOPROL-XL) 25 MG] Take 25 mg by mouth daily.  9/16/16  Yes Provider, Historical   nitroglycerin (NITROSTAT) 0.4 MG SL tablet [NITROGLYCERIN (NITROSTAT) 0.4 MG SL TABLET] Place 0.4 mg under the tongue. 7/16/14  Yes Provider, Historical   tamsulosin (FLOMAX) 0.4 mg Cp24 [TAMSULOSIN (FLOMAX) 0.4 MG CP24] Take 0.4 mg by mouth daily after supper.  8/19/10  Yes Provider, Historical   vit C/E/Zn/coppr/lutein/zeaxan (PRESERVISION AREDS-2 ORAL) [VIT C/E/ZN/COPPR/LUTEIN/ZEAXAN (PRESERVISION AREDS-2 ORAL)] Take 1 tablet by mouth 2 (two) times a day. 3/27/19  Yes Provider, Historical              Review of Systems:   The Review of Systems is negative other than noted in the HPI            Physical Exam:     Patient Vitals for the past 24 hrs:   BP Temp Temp src Pulse Resp SpO2 Height Weight   09/16/21 0828 108/56 97.8  F (36.6  C) Oral 62 18 98 % -- --   09/16/21 0029 122/55 98.4  F (36.9  C) Oral 60 18 99 % -- --   09/15/21 2100 133/61 98.6  F (37  C) Oral 62 18 96 % 1.753 m (5' 9\") --   09/15/21 2000 116/59 -- -- 58 -- 97 % -- --   09/15/21 1929 -- -- -- 58 -- 98 % -- --   09/15/21 1554 117/60 97.3  F (36.3  C) Temporal 62 16 97 % -- 88.5 kg (195 lb)          Intake/Output Summary (Last 24 hours) at 9/16/2021 1225  Last data filed at 9/16/2021 0830  Gross per 24 hour   Intake 150 ml   Output 925 ml   Net -775 ml      Constitutional:   awake, alert, cooperative, no apparent distress, and appears stated age       Eyes:   PERRL, conjunctiva/corneas clear, EOM's intact; no scleral edema or icterus noted        ENT:   Normocephalic, without obvious abnormality, atraumatic, Lips, mucosa, and tongue normal        Hematologic / Lymphatic:   No lymphadenopathy "       Lungs:   Normal respiratory effort, no accessory muscle use       Cardiovascular:   Regular rate and rhythm       Abdomen:   Soft, nontdender       Musculoskeletal:   No obvious swelling, bruising or deformity       Skin:   LLE - multiple wounds to anterior lower leg, no drainage, the largest area of necrotic tissue is roughly 10x5 cm and mildly tender to palpation, erythema appears to be fading and well within the marks previously made             Data:        Admission on 09/15/2021   Component Date Value     CRP 09/15/2021 1.7*     Sodium 09/15/2021 139      Potassium 09/15/2021 4.3      Chloride 09/15/2021 108*     Carbon Dioxide (CO2) 09/15/2021 24      Anion Gap 09/15/2021 7      Urea Nitrogen 09/15/2021 23      Creatinine 09/15/2021 1.08      Calcium 09/15/2021 8.5      Glucose 09/15/2021 94      Alkaline Phosphatase 09/15/2021 106      AST 09/15/2021 13      ALT 09/15/2021 <9      Protein Total 09/15/2021 6.3      Albumin 09/15/2021 3.4*     Bilirubin Total 09/15/2021 0.8      GFR Estimate 09/15/2021 63      Lactic Acid 09/15/2021 0.7      WBC Count 09/15/2021 6.5      RBC Count 09/15/2021 3.21*     Hemoglobin 09/15/2021 9.6*     Hematocrit 09/15/2021 30.2*     MCV 09/15/2021 94      MCH 09/15/2021 29.9      MCHC 09/15/2021 31.8      RDW 09/15/2021 13.5      Platelet Count 09/15/2021 229      % Neutrophils 09/15/2021 67      % Lymphocytes 09/15/2021 19      % Monocytes 09/15/2021 10      % Eosinophils 09/15/2021 3      % Basophils 09/15/2021 1      % Immature Granulocytes 09/15/2021 0      NRBCs per 100 WBC 09/15/2021 0      Absolute Neutrophils 09/15/2021 4.4      Absolute Lymphocytes 09/15/2021 1.2      Absolute Monocytes 09/15/2021 0.7      Absolute Eosinophils 09/15/2021 0.2      Absolute Basophils 09/15/2021 0.0      Absolute Immature Granul* 09/15/2021 0.0      Absolute NRBCs 09/15/2021 0.0      Culture 09/15/2021 No growth after 12 hours      Culture 09/15/2021 No growth after 12 hours      SARS  CoV2 PCR 09/15/2021 Negative      CK 09/15/2021 88      Bilirubin Total 09/16/2021 0.8      Bilirubin Direct 09/16/2021 0.3      Protein Total 09/16/2021 5.5*     Albumin 09/16/2021 3.0*     Alkaline Phosphatase 09/16/2021 88      AST 09/16/2021 11      ALT 09/16/2021 <9      Sodium 09/16/2021 139      Potassium 09/16/2021 4.3      Chloride 09/16/2021 110*     Carbon Dioxide (CO2) 09/16/2021 24      Anion Gap 09/16/2021 5      Urea Nitrogen 09/16/2021 18      Creatinine 09/16/2021 1.06      Calcium 09/16/2021 8.2*     Glucose 09/16/2021 90      GFR Estimate 09/16/2021 65      CRP 09/16/2021 1.6*     WBC Count 09/16/2021 5.7      RBC Count 09/16/2021 2.98*     Hemoglobin 09/16/2021 8.9*     Hematocrit 09/16/2021 27.7*     MCV 09/16/2021 93      MCH 09/16/2021 29.9      MCHC 09/16/2021 32.1      RDW 09/16/2021 13.4      Platelet Count 09/16/2021 194      % Neutrophils 09/16/2021 63      % Lymphocytes 09/16/2021 23      % Monocytes 09/16/2021 9      % Eosinophils 09/16/2021 4      % Basophils 09/16/2021 1      % Immature Granulocytes 09/16/2021 0      NRBCs per 100 WBC 09/16/2021 0      Absolute Neutrophils 09/16/2021 3.6      Absolute Lymphocytes 09/16/2021 1.3      Absolute Monocytes 09/16/2021 0.5      Absolute Eosinophils 09/16/2021 0.2      Absolute Basophils 09/16/2021 0.1      Absolute Immature Granul* 09/16/2021 0.0      Absolute NRBCs 09/16/2021 0.0          All imaging studies reviewed by me.

## 2021-09-16 NOTE — PLAN OF CARE
Problem: Skin or Soft Tissue Infection  Goal: Infection Symptom Resolution  Outcome: Improving   Patient is receiving IV antibiotics. Patient was seen by wound nurse this shift. Will continue to monitor.    NATHALY CAMEJO RN

## 2021-09-16 NOTE — PROGRESS NOTES
[facility] Medicine PROGRESS NOTE      Identification/Summary:   Patrick Green is a 81 year old old male with dementia that presented with complaints of leg pain, swelling, redness. Symptoms started after an abrasion climbing off of a ladder. Seen as outpt, started abx, symptoms worsened rapidly per family.      Assessment:  Severe skin/soft tissue infection, left leg cellulitis  No systemic symptoms at this time  Continue with vanco, zosyn, clindamycin for another day  Plan to transition to oral antibiotics in 2 days  Monitor vitals, labs  We will ask for wound care evaluation, no indication for surgery consult at this time     Dementia  Noted, watch for delirium  Continue aricept, namenda  seroquel prn     Anemia  Mild, asymptomatic, drop from 12-9 in last 3 months, no obvious evidence of acute loss  WBC and plts ok  Will continue to trend, start PPI for GI protection  Will not use anticoagulation for this reason, on asa and plavix typically     Htn, CAD  Continue home bp meds, ASA, plavix, statin  BP and HR well controlled     DVT proph: aspirin, plavix  Code Status: DNR, discussed with family at time of admission, pt has advanced directive per family that indicates DNR/I status  Disposition: Inpatient   Diet:reg  Pain tx: tylenol  PT/OT: none now    Interval History/Subjective:  Feels like his leg is improving. Still painful but a little bit better. Leg swelling is better. No fever or chills. No cough, chest pain, shortness of breath.    Physical Exam/Objective:  Gen: no acute distress, comfortable  ENT: no scleral icterus  Pulm: Breathing comfortably in room air at rest  CV: regular rate and rhythm, no significant pitting edema  Derm: Foul-smelling skin changes of the left lower extremity with some necrotic appearing superficial tissue, no obvious drainable abscess, erythema and warmth and swelling all seem to be receding from the demarcated line present from yesterday.  Psych: appropriate  affect    Medications:     aspirin  81 mg Oral Daily     atorvastatin  80 mg Oral At Bedtime     clindamycin  600 mg Intravenous Q8H     clopidogrel  75 mg Oral At Bedtime     donepezil  10 mg Oral QAM     finasteride  5 mg Oral Daily     lisinopril  5 mg Oral Daily     memantine  10 mg Oral BID     metoprolol succinate ER  25 mg Oral Daily     piperacillin-tazobactam  3.375 g Intravenous Q8H     sodium chloride (PF)  3 mL Intracatheter Q8H     tamsulosin  0.4 mg Oral Daily with supper     vancomycin  1,500 mg Intravenous Q24H       Patrick Spivey DO   Hospitalist  [facility]

## 2021-09-16 NOTE — PLAN OF CARE
Problem: Adult Inpatient Plan of Care  Goal: Absence of Hospital-Acquired Illness or Injury  Outcome: Improving     Problem: Risk for Delirium  Goal: Improved Behavioral Control  Outcome: Improving     Problem: Risk for Delirium  Goal: Improved Attention and Thought Clarity  Outcome: Improving       Patient disoriented to time and forgetful. PRN tylenol given for moderate pain in LLE. LLE ace wrap CDI. Malodor present. Clindamycin and zosyn given overnight. VSS on room air. Bed alarm on and active.

## 2021-09-16 NOTE — PHARMACY-VANCOMYCIN DOSING SERVICE
"Pharmacy Vancomycin Initial Note  Date of Service September 15, 2021  Patient's  1940  81 year old, male    Indication: Skin and Soft Tissue Infection, rule-out Zeus's gangrene.    Current estimated CrCl = Estimated Creatinine Clearance: 57 mL/min (based on SCr of 1.08 mg/dL).    Creatinine for last 3 days  9/15/2021:  5:36 PM Creatinine 1.08 mg/dL    Recent Vancomycin Level(s) for last 3 days  No results found for requested labs within last 72 hours.      Vancomycin IV Administrations (past 72 hours)                   vancomycin 1750 mg in 0.9% NaCl 500 ml intermittent infusion 1,750 mg (mg) 1,750 mg Given 09/15/21 1954                Nephrotoxins and other renal medications (From now, onward)    Start     Dose/Rate Route Frequency Ordered Stop    21  vancomycin 1500 mg in 0.9% NaCl 250 ml intermittent infusion 1,500 mg      1,500 mg  over 90 Minutes Intravenous EVERY 24 HOURS 09/15/21 22321 09  lisinopril (ZESTRIL) tablet 5 mg      5 mg Oral DAILY 09/15/21 2044      09/15/21 2330  piperacillin-tazobactam (ZOSYN) 3.375 g vial to attach to  mL bag     Note to Pharmacy: For [facility], [facility] and [facility]: For Zosyn-naive patients, use the \"Zosyn initial dose + extended infusion\" order panel.    3.375 g  over 240 Minutes Intravenous EVERY 8 HOURS 09/15/21 2044            Contrast Orders - past 72 hours (72h ago, onward)    None          Loading dose: 1750 mg at 20:00 09/15/2021.  Regimen: 1500 mg IV every 24 hours.  Start time: 22:35 on 09/15/2021  Exposure target: AUC24 (range)400-600 mg/L.hr   AUC24,ss: 534 mg/L.hr  Probability of AUC24 > 400: 80 %  Ctrough,ss: 16 mg/L  Probability of Ctrough,ss > 20: 31 %  Probability of nephrotoxicity (Lodise MIKE ): 11 %        Plan:  1. Following load of 1750 mg (20 mg/kg) in ED, change to vancomycin  1500 mg (17 mg/kg) IV q24h.   2. Vancomycin monitoring method: AUC  3. Vancomycin therapeutic monitoring goal: 400-600 mg*h/L  4. Pharmacy will " check vancomycin levels as appropriate in 1-3 Days.    5. Serum creatinine levels will be ordered daily for the first week of therapy and at least twice weekly for subsequent weeks.      Ana Paula Whitfield RPH

## 2021-09-16 NOTE — CONSULTS
Care Management Initial Consult    General Information  Assessment completed with: Patient, Children, Brandon and Jose Antonio  Type of CM/SW Visit: Initial Assessment    Primary Care Provider verified and updated as needed: Yes   Readmission within the last 30 days: no previous admission in last 30 days      Reason for Consult: discharge planning  Advance Care Planning: Advance Care Planning Reviewed: no concerns identified          Communication Assessment  Patient's communication style: spoken language (English or Bilingual)    Hearing Difficulty or Deaf: yes   Wear Glasses or Blind: yes    Cognitive  Cognitive/Neuro/Behavioral: WDL                      Living Environment:   People in home: child(jing), adult     Current living Arrangements: house      Able to return to prior arrangements: yes       Family/Social Support:  Care provided by: self, child(jing)  Provides care for: no one  Marital Status:   Children          Description of Support System: Involved, Supportive    Support Assessment: Adequate social supports    Current Resources:   Patient receiving home care services: No     Community Resources: None  Equipment currently used at home: none  Supplies currently used at home: None    Employment/Financial:  Employment Status: retired        Financial Concerns: No concerns identified   Referral to Financial Counselor: No       Lifestyle & Psychosocial Needs:  Social Determinants of Health     Tobacco Use: Medium Risk     Smoking Tobacco Use: Former Smoker     Smokeless Tobacco Use: Never Used   Alcohol Use:      Frequency of Alcohol Consumption:      Average Number of Drinks:      Frequency of Binge Drinking:    Financial Resource Strain:      Difficulty of Paying Living Expenses:    Food Insecurity:      Worried About Running Out of Food in the Last Year:      Ran Out of Food in the Last Year:    Transportation Needs:      Lack of Transportation (Medical):      Lack of Transportation (Non-Medical):    Physical  Activity:      Days of Exercise per Week:      Minutes of Exercise per Session:    Stress:      Feeling of Stress :    Social Connections:      Frequency of Communication with Friends and Family:      Frequency of Social Gatherings with Friends and Family:      Attends [employer] Services:      Active Member of Clubs or Organizations:      Attends Club or Organization Meetings:      Marital Status:    Intimate Partner Violence:      Fear of Current or Ex-Partner:      Emotionally Abused:      Physically Abused:      Sexually Abused:    Depression:      PHQ-2 Score:    Housing Stability:      Unable to Pay for Housing in the Last Year:      Number of Places Lived in the Last Year:      Unstable Housing in the Last Year:        Functional Status:  Prior to admission patient needed assistance:   Dependent ADLs:: Independent  Dependent IADLs:: Transportation  Assesssment of Functional Status: At functional baseline    Mental Health Status:  Mental Health Status: No Current Concerns       Chemical Dependency Status:  Chemical Dependency Status: No Current Concerns             Values/Beliefs:  Spiritual, Cultural Beliefs, [employer] Practices, Values that affect care: no               Additional Information:  RONALD assessed. Pt resides at home with his son, Olaf, and he reports independence with ADLs and IADLs. Pt's son Jose Antonio provides transportation assist for shopping and doctor appointments. Pt denies using medical equipment or supplies. Discharge goal to return home with family transport, no anticipated service needs.    Kary Keene LGSW

## 2021-09-16 NOTE — PLAN OF CARE
Problem: Adult Inpatient Plan of Care  Goal: Absence of Hospital-Acquired Illness or Injury  Intervention: Identify and Manage Fall Risk  Recent Flowsheet Documentation  Taken 9/15/2021 2200 by Angelia Adams RN  Safety Promotion/Fall Prevention:   bed alarm on   nonskid shoes/slippers when out of bed   mobility aid in reach   lighting adjusted     Problem: Skin or Soft Tissue Infection  Goal: Infection Symptom Resolution  Outcome: Improving     Problem: Adult Inpatient Plan of Care  Goal: Plan of Care Review  Outcome: Improving   Patient alert and oriented. Disoriented to situation and forgetful. VSS. LS clear. Tolerating regular diet. Voiding spontaneously. LLE cellulitis. ACE wrap clean dry and intact. On IV antibiotics. LLE elevated on pillow. CMS intact. Will monitor and continue plan of care.

## 2021-09-16 NOTE — CONSULTS
Wound Ostomy  WOC Assessment       Allergies:  Diatrizoate  Simvastatin  Zocor [Simvastatin]    Diagnosis:   Patient Active Problem List    Diagnosis Date Noted     Cellulitis of left lower extremity 09/15/2021     Priority: Medium     Cellulitis of leg, left 09/15/2021     Priority: Medium     Near syncope 03/05/2018     Priority: Medium     Pre-syncope 03/05/2018     Priority: Medium     Coronary artery disease due to calcified coronary lesion      Priority: Medium     Nonsustained ventricular tachycardia (H) 12/10/2016     Priority: Medium     10 beat run of wide complex tachycardia accelerating to a rate of 150 bpm,   asymptomatic.         Dementia (H)      Priority: Medium     Dyslipidemia, goal LDL below 70      Priority: Medium     Abnormal nuclear stress test, low risk 12/09/2016     Priority: Medium     Small inferolateral ischemic defect per Dr. Blount; EF > 70%.         Severe dizziness 12/08/2016     Priority: Medium     Nausea and vomiting 12/08/2016     Priority: Medium       Height:  [unfilled]    Weight:  [unfilled]    Labs:  Recent Labs   Lab Test 09/16/21  0619   CRP 1.6*   HGB 8.9*   ALBUMIN 3.0*       Héctor:  Héctor Score: 18    Specialty Bed:       Wound culture obtained: No    Edema:  Yes:  Localized but decreasing per patient and nurse report    Anatomic Site/Laterality: LLE - shin    Reason for ongoing care:   Wound assessment and plan of care     Encounter Type:  Initial Wound Type:   Denudement:  Foreign body present? No    Tissue Damage:   Exposed fat layer    Related trauma: Traumatic injury related to fall from ladder (approximately 6 - 7 rungs per patient) about 5 days ago per patient report    Assessment: (see photo below - taken in ED on 9/15/21)    From knee to supra-ankle area there are multiple scattered wounds. The majority are covered with dried drainage. The largest area of injury (appears black in photo below) is 9 cm x 5 cm x 1.5 cm with undermining to entire area around  wound from 3 - 5 cm.     Patient rates pain at 5 - 6 but tolerated mechanical debridement/palpation/probing and cleaning of site by [facility] nurse without significant increase in pain. Nursing staff contacted to give patient pain meds based on his rating and request for pain meds. Tylenol was given by nursing staff.    Tunneling/Undermining: Yes - see above    Wound Bed: 100% necrotic tissue v. hematoma    Exudate: Yes Sanguineous Small    Periwound Skin: see photo below    Treatment Plan: Gauze: Dry at this time until surgery able to assess for bed side debridement v. need for OR debridement         Nursing care provided was coordinated care with RN.    Discussed plan of care with nurse, patient and MD (surgical consult recommendation).    Outcomes and treatment recommendations are to promote skin integrity, contain exudate, promote wound healing and promote debridement autolytic.    Actions taken by [facility] RN: 10 minutes of education.    Planned Follow Up: Later this week and Early next week.    Plan for next visit: Reassess wound(s), Reassess skin integrity and Write discharge recommendations.

## 2021-09-17 PROCEDURE — G0463 HOSPITAL OUTPT CLINIC VISIT: HCPCS

## 2021-09-17 PROCEDURE — 250N000013 HC RX MED GY IP 250 OP 250 PS 637: Performed by: HOSPITALIST

## 2021-09-17 PROCEDURE — 99232 SBSQ HOSP IP/OBS MODERATE 35: CPT | Performed by: HOSPITALIST

## 2021-09-17 PROCEDURE — 250N000011 HC RX IP 250 OP 636: Performed by: HOSPITALIST

## 2021-09-17 PROCEDURE — 99231 SBSQ HOSP IP/OBS SF/LOW 25: CPT | Performed by: SURGERY

## 2021-09-17 PROCEDURE — 250N000009 HC RX 250: Performed by: HOSPITALIST

## 2021-09-17 PROCEDURE — 120N000001 HC R&B MED SURG/OB

## 2021-09-17 PROCEDURE — 99207 PR NO CHARGE LOS: CPT | Performed by: PHYSICIAN ASSISTANT

## 2021-09-17 PROCEDURE — 258N000003 HC RX IP 258 OP 636: Performed by: HOSPITALIST

## 2021-09-17 RX ORDER — SILVER SULFADIAZINE 10 MG/G
CREAM TOPICAL DAILY
Status: DISCONTINUED | OUTPATIENT
Start: 2021-09-17 | End: 2021-09-18 | Stop reason: HOSPADM

## 2021-09-17 RX ADMIN — MEMANTINE HYDROCHLORIDE 10 MG: 10 TABLET, FILM COATED ORAL at 21:50

## 2021-09-17 RX ADMIN — QUETIAPINE FUMARATE 25 MG: 25 TABLET ORAL at 20:58

## 2021-09-17 RX ADMIN — CLINDAMYCIN PHOSPHATE 600 MG: 600 INJECTION, SOLUTION INTRAVENOUS at 10:02

## 2021-09-17 RX ADMIN — ATORVASTATIN CALCIUM 80 MG: 40 TABLET, FILM COATED ORAL at 20:58

## 2021-09-17 RX ADMIN — PIPERACILLIN AND TAZOBACTAM 3.38 G: 3; .375 INJECTION, POWDER, LYOPHILIZED, FOR SOLUTION INTRAVENOUS at 14:31

## 2021-09-17 RX ADMIN — SILVER SULFADIAZINE: 10 CREAM TOPICAL at 16:04

## 2021-09-17 RX ADMIN — MEMANTINE HYDROCHLORIDE 10 MG: 10 TABLET, FILM COATED ORAL at 10:02

## 2021-09-17 RX ADMIN — SILVER SULFADIAZINE: 10 CREAM TOPICAL at 14:32

## 2021-09-17 RX ADMIN — ACETAMINOPHEN 1000 MG: 500 TABLET, FILM COATED ORAL at 16:18

## 2021-09-17 RX ADMIN — CLINDAMYCIN PHOSPHATE 600 MG: 600 INJECTION, SOLUTION INTRAVENOUS at 01:54

## 2021-09-17 RX ADMIN — ASPIRIN 81 MG: 81 TABLET, DELAYED RELEASE ORAL at 10:02

## 2021-09-17 RX ADMIN — FINASTERIDE 5 MG: 5 TABLET, FILM COATED ORAL at 10:02

## 2021-09-17 RX ADMIN — METOPROLOL SUCCINATE 25 MG: 25 TABLET, EXTENDED RELEASE ORAL at 10:02

## 2021-09-17 RX ADMIN — QUETIAPINE FUMARATE 25 MG: 25 TABLET ORAL at 00:16

## 2021-09-17 RX ADMIN — DONEPEZIL HYDROCHLORIDE 10 MG: 5 TABLET, FILM COATED ORAL at 06:36

## 2021-09-17 RX ADMIN — VANCOMYCIN HYDROCHLORIDE 1500 MG: 5 INJECTION, POWDER, LYOPHILIZED, FOR SOLUTION INTRAVENOUS at 20:59

## 2021-09-17 RX ADMIN — PANTOPRAZOLE SODIUM 40 MG: 20 TABLET, DELAYED RELEASE ORAL at 06:36

## 2021-09-17 RX ADMIN — CLOPIDOGREL BISULFATE 75 MG: 75 TABLET, FILM COATED ORAL at 20:58

## 2021-09-17 RX ADMIN — LISINOPRIL 5 MG: 5 TABLET ORAL at 10:02

## 2021-09-17 RX ADMIN — TAMSULOSIN HYDROCHLORIDE 0.4 MG: 0.4 CAPSULE ORAL at 16:18

## 2021-09-17 RX ADMIN — PIPERACILLIN AND TAZOBACTAM 3.38 G: 3; .375 INJECTION, POWDER, LYOPHILIZED, FOR SOLUTION INTRAVENOUS at 06:35

## 2021-09-17 RX ADMIN — ACETAMINOPHEN 1000 MG: 500 TABLET, FILM COATED ORAL at 10:11

## 2021-09-17 RX ADMIN — PIPERACILLIN AND TAZOBACTAM 3.38 G: 3; .375 INJECTION, POWDER, LYOPHILIZED, FOR SOLUTION INTRAVENOUS at 23:38

## 2021-09-17 NOTE — PROGRESS NOTES
[facility] Medicine PROGRESS NOTE      Identification/Summary:   Patrick Green is a 81 year old old male with dementia that presented with complaints of leg pain, swelling, redness. Symptoms started after an abrasion climbing off of a ladder. Seen as outpt, started abx, symptoms worsened rapidly per family.      Assessment:  Severe skin/soft tissue infection, left leg cellulitis  No systemic symptoms   Continue with vanco, zosyn, for 1 more day  Monitor vitals, labs  Eventually requested surgery involvement after wound care nurse assessment  Appreciate wound care recommendations  We will continue 1 more day of IV antibiotics, plan to transition to oral tomorrow  Discontinue clindamycin     Dementia  Noted, watch for delirium  Continue aricept, namenda  seroquel prn     Anemia  Mild, asymptomatic, drop from 12-9 in last 3 months, no obvious evidence of acute loss  WBC and plts ok  Will continue to trend hemoglobin, started PPI for GI protection  Will not use anticoagulation for this reason, on asa and plavix typically     Htn, CAD  Continue home bp meds, ASA, plavix, statin  BP and HR well controlled     DVT proph: aspirin, plavix  Code Status: DNR, discussed with family at time of admission, pt has advanced directive per family that indicates DNR/I status  Disposition: Inpatient   Diet:reg  Pain tx: tylenol  PT/OT: none now    Interval History/Subjective:  Still having pain in the left leg, just below the knee.  Denies any fever or chills.  No chest pain, shortness of breath.    Physical Exam/Objective:  Gen: no acute distress, comfortable  ENT: no scleral icterus  Pulm: Breathing comfortably in room air at rest  CV: regular rate and rhythm, no significant pitting edema  Derm: Left leg erythema seems to be receding, did not completely undressed the wound today..  Psych: appropriate affect, pleasant and slightly forgetful    Medications:     aspirin  81 mg Oral Daily     atorvastatin  80 mg Oral At Bedtime      clindamycin  600 mg Intravenous Q8H     clopidogrel  75 mg Oral At Bedtime     donepezil  10 mg Oral QAM     finasteride  5 mg Oral Daily     lisinopril  5 mg Oral Daily     memantine  10 mg Oral BID     metoprolol succinate ER  25 mg Oral Daily     pantoprazole  40 mg Oral QAM AC     piperacillin-tazobactam  3.375 g Intravenous Q8H     sodium chloride (PF)  3 mL Intracatheter Q8H     tamsulosin  0.4 mg Oral Daily with supper     vancomycin  1,500 mg Intravenous Q24H       Patrick Spivey DO   Hospitalist  [facility]

## 2021-09-17 NOTE — PLAN OF CARE
Pt orientation fluctuates. Is consistently oriented to self but rarely oriented to situation and place and time overnight. Bandage changed on leg wound due to moderate drainage. IV access restored, twice. Patient pulled out IV despite  coban wrapping. On IV abx. IV abx rescheduled due to loss of IV access for a few hours. Remains afebrile. VSS. Gave seroquel for agitation.     Problem: Skin or Soft Tissue Infection  Goal: Infection Symptom Resolution  Outcome: Improving

## 2021-09-17 NOTE — PLAN OF CARE
[id number]  Problem: Skin or Soft Tissue Infection  Goal: Infection Symptom Resolution  Outcome: Improving     Problem: Impaired Wound Healing  Goal: Optimal Wound Healing  Outcome: Improving  Intervention: Promote Wound Healing  Recent Flowsheet Documentation  Taken 9/17/2021 1100 by Raiza Anyaa, RN  Activity Management: (to bed) ambulated in room  Taken 9/17/2021 0930 by Raiza Anaya, RN  Pain Management Interventions: medication (see MAR)      LLE pain managed with PRN tylenol x1. Changed dressing with Aquacel and silver cream, elevated extremities to assist edema. Patient states legs look less swollen and feel better today. Alarms for safety, some confusion fluctuating, but is able to make his needs known. Patient oriented to self and time.

## 2021-09-17 NOTE — PROGRESS NOTES
General Surgery Progress Note:    Hospital Day # 2    ASSESSMENT:   1. Cellulitis of left lower extremity    2. Open wound of knee, leg, and ankle, left, initial encounter        Patrick Green is a 81 year old male with lower left leg wound and cellulitis.    PLAN:   -Continue to keep wound covered with gauze and I wrapped the leg with Kerlix instead to avoid tape on the skin.  -Wound is mainly dry with some subcutaneous edema and does have an open portion that is basically necrotic/old hematoma that will slough off.  -No surgical debridement needed and continue with wound care as per WOCN nurse.  -We will sign off this time.  Please call if is any questions or concerns.      SUBJECTIVE:   Patrick Green is feeling well and has no complaints.  No significant events overnight.  Afebrile.    Patient Vitals for the past 24 hrs:   BP Temp Temp src Pulse Resp SpO2   09/17/21 0747 (!) 154/72 97.4  F (36.3  C) Oral 60 16 97 %   09/17/21 0012 123/61 98.1  F (36.7  C) Oral 68 16 98 %   09/16/21 1611 124/57 97.6  F (36.4  C) Oral 60 18 99 %       Physical Exam:  General: NAD, pleasant    EXT: Left lower leg some mild subcutaneous edema and erythema on the shin.  Looks to be slowly improving.  Open wound mainly dry eschar with the lateral aspect being open and moist which is a little bit of her old blood that is sloughing off    Admission on 09/15/2021   Component Date Value     CRP 09/15/2021 1.7*     Sodium 09/15/2021 139      Potassium 09/15/2021 4.3      Chloride 09/15/2021 108*     Carbon Dioxide (CO2) 09/15/2021 24      Anion Gap 09/15/2021 7      Urea Nitrogen 09/15/2021 23      Creatinine 09/15/2021 1.08      Calcium 09/15/2021 8.5      Glucose 09/15/2021 94      Alkaline Phosphatase 09/15/2021 106      AST 09/15/2021 13      ALT 09/15/2021 <9      Protein Total 09/15/2021 6.3      Albumin 09/15/2021 3.4*     Bilirubin Total 09/15/2021 0.8      GFR Estimate 09/15/2021 63      Lactic Acid 09/15/2021 0.7      WBC Count  09/15/2021 6.5      RBC Count 09/15/2021 3.21*     Hemoglobin 09/15/2021 9.6*     Hematocrit 09/15/2021 30.2*     MCV 09/15/2021 94      MCH 09/15/2021 29.9      MCHC 09/15/2021 31.8      RDW 09/15/2021 13.5      Platelet Count 09/15/2021 229      % Neutrophils 09/15/2021 67      % Lymphocytes 09/15/2021 19      % Monocytes 09/15/2021 10      % Eosinophils 09/15/2021 3      % Basophils 09/15/2021 1      % Immature Granulocytes 09/15/2021 0      NRBCs per 100 WBC 09/15/2021 0      Absolute Neutrophils 09/15/2021 4.4      Absolute Lymphocytes 09/15/2021 1.2      Absolute Monocytes 09/15/2021 0.7      Absolute Eosinophils 09/15/2021 0.2      Absolute Basophils 09/15/2021 0.0      Absolute Immature Granul* 09/15/2021 0.0      Absolute NRBCs 09/15/2021 0.0      Culture 09/15/2021 No growth after 1 day      Culture 09/15/2021 No growth after 1 day      SARS CoV2 PCR 09/15/2021 Negative      CK 09/15/2021 88      Bilirubin Total 09/16/2021 0.8      Bilirubin Direct 09/16/2021 0.3      Protein Total 09/16/2021 5.5*     Albumin 09/16/2021 3.0*     Alkaline Phosphatase 09/16/2021 88      AST 09/16/2021 11      ALT 09/16/2021 <9      Sodium 09/16/2021 139      Potassium 09/16/2021 4.3      Chloride 09/16/2021 110*     Carbon Dioxide (CO2) 09/16/2021 24      Anion Gap 09/16/2021 5      Urea Nitrogen 09/16/2021 18      Creatinine 09/16/2021 1.06      Calcium 09/16/2021 8.2*     Glucose 09/16/2021 90      GFR Estimate 09/16/2021 65      CRP 09/16/2021 1.6*     WBC Count 09/16/2021 5.7      RBC Count 09/16/2021 2.98*     Hemoglobin 09/16/2021 8.9*     Hematocrit 09/16/2021 27.7*     MCV 09/16/2021 93      MCH 09/16/2021 29.9      MCHC 09/16/2021 32.1      RDW 09/16/2021 13.4      Platelet Count 09/16/2021 194      % Neutrophils 09/16/2021 63      % Lymphocytes 09/16/2021 23      % Monocytes 09/16/2021 9      % Eosinophils 09/16/2021 4      % Basophils 09/16/2021 1      % Immature Granulocytes 09/16/2021 0      NRBCs per 100 WBC  09/16/2021 0      Absolute Neutrophils 09/16/2021 3.6      Absolute Lymphocytes 09/16/2021 1.3      Absolute Monocytes 09/16/2021 0.5      Absolute Eosinophils 09/16/2021 0.2      Absolute Basophils 09/16/2021 0.1      Absolute Immature Granul* 09/16/2021 0.0      Absolute NRBCs 09/16/2021 0.0         Kian Read PA-C

## 2021-09-17 NOTE — PROGRESS NOTES
Wound Ostomy  WOC Assessment       Allergies:  Diatrizoate  Simvastatin  Zocor [Simvastatin]    Diagnosis:   Patient Active Problem List    Diagnosis Date Noted     Cellulitis of left lower extremity 09/15/2021     Priority: Medium     Cellulitis of leg, left 09/15/2021     Priority: Medium     Near syncope 03/05/2018     Priority: Medium     Pre-syncope 03/05/2018     Priority: Medium     Coronary artery disease due to calcified coronary lesion      Priority: Medium     Nonsustained ventricular tachycardia (H) 12/10/2016     Priority: Medium     10 beat run of wide complex tachycardia accelerating to a rate of 150 bpm,   asymptomatic.         Dementia (H)      Priority: Medium     Dyslipidemia, goal LDL below 70      Priority: Medium     Abnormal nuclear stress test, low risk 12/09/2016     Priority: Medium     Small inferolateral ischemic defect per Dr. Blount; EF > 70%.         Severe dizziness 12/08/2016     Priority: Medium     Nausea and vomiting 12/08/2016     Priority: Medium     Labs:  Recent Labs   Lab Test 09/16/21  0619   CRP 1.6*   HGB 8.9*   ALBUMIN 3.0*       Héctor:  Héctor Score: 18    Specialty Bed:       Wound culture obtained: No    Edema:  Yes:  Localized but decreasing per patient and nurse report    Anatomic Site/Laterality: LLE - shin    Reason for ongoing care:   Wound assessment and plan of care     Encounter Type:  Subsequent Encounter Reviewed patient's chart and unfortunately surgery has declined sharp debridement for this wound. Given significant amount of non-viable tissue present, this may take several weeks to debride with autolytic debridement. As much non-viable tissue as was able to be removed with mechanical debridement attempted yesterday.    Will begin Silvadene daily to promote antimicrobial effects as well as promote autolytic debridement. WOC will continue to follow while patient remains hospitalized.    See below for WOC wound assessment from 9/16/21.    Wound Type:    Denudement:  Foreign body present? No    Tissue Damage:   Exposed fat layer    Related trauma: Traumatic injury related to fall from ladder (approximately 6 - 7 rungs per patient) about 5 days ago per patient report    Assessment: (see photo below - taken in ED on 9/15/21)    From knee to supra-ankle area there are multiple scattered wounds. The majority are covered with dried drainage. The largest area of injury (appears black in photo below) is 9 cm x 5 cm x 1.5 cm with undermining to entire area around wound from 3 - 5 cm.     Patient rates pain at 5 - 6 but tolerated mechanical debridement/palpation/probing and cleaning of site by WO nurse without significant increase in pain. Nursing staff contacted to give patient pain meds based on his rating and request for pain meds. Tylenol was given by nursing staff.    Tunneling/Undermining: Yes - see above    Wound Bed: 100% necrotic tissue v. hematoma    Exudate: Yes Sanguineous Small    Periwound Skin: see photo below    Treatment Plan: Gauze: Dry at this time until surgery able to assess for bed side debridement v. need for OR debridement         Nursing care provided was coordinated care with MD.    Discussed plan of care with MD.    Outcomes and treatment recommendations are to promote skin integrity, contain exudate, promote wound healing and promote debridement autolytic.    Actions taken by [facility] RN: [facility] Discharge recommendations entered.    Planned Follow Up: Tuesday.    Plan for next visit: Reassess wound(s) and Reassess skin integrity.

## 2021-09-18 VITALS
HEART RATE: 78 BPM | OXYGEN SATURATION: 97 % | HEIGHT: 69 IN | TEMPERATURE: 97.2 F | WEIGHT: 195 LBS | SYSTOLIC BLOOD PRESSURE: 155 MMHG | BODY MASS INDEX: 28.88 KG/M2 | DIASTOLIC BLOOD PRESSURE: 79 MMHG | RESPIRATION RATE: 16 BRPM

## 2021-09-18 LAB
ANION GAP SERPL CALCULATED.3IONS-SCNC: 4 MMOL/L (ref 5–18)
BUN SERPL-MCNC: 19 MG/DL (ref 8–28)
CALCIUM SERPL-MCNC: 7.7 MG/DL (ref 8.5–10.5)
CHLORIDE BLD-SCNC: 111 MMOL/L (ref 98–107)
CO2 SERPL-SCNC: 23 MMOL/L (ref 22–31)
CREAT SERPL-MCNC: 1.03 MG/DL (ref 0.7–1.3)
ERYTHROCYTE [DISTWIDTH] IN BLOOD BY AUTOMATED COUNT: 13.4 % (ref 10–15)
GFR SERPL CREATININE-BSD FRML MDRD: 67 ML/MIN/1.73M2
GLUCOSE BLD-MCNC: 94 MG/DL (ref 70–125)
HCT VFR BLD AUTO: 26.7 % (ref 40–53)
HGB BLD-MCNC: 8.5 G/DL (ref 13.3–17.7)
MCH RBC QN AUTO: 29.9 PG (ref 26.5–33)
MCHC RBC AUTO-ENTMCNC: 31.8 G/DL (ref 31.5–36.5)
MCV RBC AUTO: 94 FL (ref 78–100)
PLATELET # BLD AUTO: 189 10E3/UL (ref 150–450)
POTASSIUM BLD-SCNC: 4.3 MMOL/L (ref 3.5–5)
RBC # BLD AUTO: 2.84 10E6/UL (ref 4.4–5.9)
SODIUM SERPL-SCNC: 138 MMOL/L (ref 136–145)
WBC # BLD AUTO: 5.7 10E3/UL (ref 4–11)

## 2021-09-18 PROCEDURE — 250N000013 HC RX MED GY IP 250 OP 250 PS 637: Performed by: HOSPITALIST

## 2021-09-18 PROCEDURE — 85027 COMPLETE CBC AUTOMATED: CPT | Performed by: HOSPITALIST

## 2021-09-18 PROCEDURE — 36415 COLL VENOUS BLD VENIPUNCTURE: CPT | Performed by: HOSPITALIST

## 2021-09-18 PROCEDURE — 250N000011 HC RX IP 250 OP 636: Performed by: HOSPITALIST

## 2021-09-18 PROCEDURE — 80048 BASIC METABOLIC PNL TOTAL CA: CPT | Performed by: HOSPITALIST

## 2021-09-18 PROCEDURE — 99239 HOSP IP/OBS DSCHRG MGMT >30: CPT | Performed by: HOSPITALIST

## 2021-09-18 RX ORDER — SILVER SULFADIAZINE 10 MG/G
CREAM TOPICAL DAILY
Qty: 50 G | Refills: 0 | Status: SHIPPED | OUTPATIENT
Start: 2021-09-18

## 2021-09-18 RX ORDER — DOXYCYCLINE 100 MG/1
100 TABLET ORAL 2 TIMES DAILY
Qty: 28 TABLET | Refills: 0 | Status: SHIPPED | OUTPATIENT
Start: 2021-09-18 | End: 2021-10-02

## 2021-09-18 RX ORDER — LEVOFLOXACIN 500 MG/1
500 TABLET, FILM COATED ORAL DAILY
Qty: 14 TABLET | Refills: 0 | Status: SHIPPED | OUTPATIENT
Start: 2021-09-18 | End: 2021-10-02

## 2021-09-18 RX ORDER — CLOPIDOGREL BISULFATE 75 MG/1
75 TABLET ORAL AT BEDTIME
Start: 2021-09-18

## 2021-09-18 RX ORDER — FERROUS SULFATE 325(65) MG
325 TABLET ORAL EVERY OTHER DAY
Qty: 30 TABLET | Refills: 0 | Status: SHIPPED | OUTPATIENT
Start: 2021-09-18 | End: 2021-09-18

## 2021-09-18 RX ORDER — FERROUS SULFATE 325(65) MG
325 TABLET ORAL EVERY OTHER DAY
Qty: 30 TABLET | Refills: 0 | Status: SHIPPED | OUTPATIENT
Start: 2021-09-18

## 2021-09-18 RX ORDER — CLOPIDOGREL BISULFATE 75 MG/1
75 TABLET ORAL AT BEDTIME
Start: 2021-09-25 | End: 2021-09-18

## 2021-09-18 RX ADMIN — PANTOPRAZOLE SODIUM 40 MG: 20 TABLET, DELAYED RELEASE ORAL at 06:18

## 2021-09-18 RX ADMIN — METOPROLOL SUCCINATE 25 MG: 25 TABLET, EXTENDED RELEASE ORAL at 08:34

## 2021-09-18 RX ADMIN — ASPIRIN 81 MG: 81 TABLET, DELAYED RELEASE ORAL at 08:34

## 2021-09-18 RX ADMIN — LISINOPRIL 5 MG: 5 TABLET ORAL at 08:33

## 2021-09-18 RX ADMIN — DONEPEZIL HYDROCHLORIDE 10 MG: 5 TABLET, FILM COATED ORAL at 06:18

## 2021-09-18 RX ADMIN — SILVER SULFADIAZINE 1 APPLICATOR: 10 CREAM TOPICAL at 08:34

## 2021-09-18 RX ADMIN — FINASTERIDE 5 MG: 5 TABLET, FILM COATED ORAL at 08:34

## 2021-09-18 RX ADMIN — MEMANTINE HYDROCHLORIDE 10 MG: 10 TABLET, FILM COATED ORAL at 08:34

## 2021-09-18 RX ADMIN — ACETAMINOPHEN 1000 MG: 500 TABLET, FILM COATED ORAL at 03:12

## 2021-09-18 RX ADMIN — PIPERACILLIN AND TAZOBACTAM 3.38 G: 3; .375 INJECTION, POWDER, LYOPHILIZED, FOR SOLUTION INTRAVENOUS at 06:18

## 2021-09-18 NOTE — DISCHARGE SUMMARY
[facility] MEDICINE  DISCHARGE SUMMARY     Primary Care Physician: Rolando Rueda  Admission Date: 9/15/2021   Discharge Provider: Patrick Spivey DO Discharge Date: 9/18/2021   Diet:   Active Diet and Nourishment Order   Procedures     Advance Diet as Tolerated: Regular Diet Adult     Diet       Code Status: No CPR- Do NOT Intubate   Activity: No strenuous activity, keep left leg elevated at all times while at rest        Condition at Discharge: Stable     REASON FOR PRESENTATION(See Admission Note for Details)   Left leg pain, redness, swelling, infection  PRINCIPAL & ACTIVE DISCHARGE DIAGNOSES   Necrotic cellulitis, complicated left lower extremity wound infection with cellulitis  PENDING LABS     Unresulted Labs Ordered in the Past 30 Days of this Admission     Date and Time Order Name Status Description    9/15/2021  6:50 PM Blood Culture Peripheral Blood Preliminary     9/15/2021  6:50 PM Blood Culture Peripheral Blood Preliminary         PROCEDURES ( this hospitalization only)      RECOMMENDATIONS TO OUTPATIENT PROVIDER FOR F/U VISIT   Follow-up Appointments     Follow-up and recommended labs and tests       Follow up with primary care provider, ROLANDO RUEDA, within 3 to 5   days, for hospital follow- up. The following labs/tests are recommended:   CBC, CRP.           Make sure that he is getting adequate wound care at home  DISPOSITION   Home with home care, ordered wound care nurse to assess patient within 48 hours  SUMMARY OF HOSPITAL COURSE:    Patrick Green is a 81 year old old male with dementia that presented with complaints of leg pain, swelling, redness. Symptoms started after an abrasion following off of a ladder. Seen as outpt, started abx, symptoms worsened rapidly resulting in presentation to the emergency department.  In the emergency department he was found to have a foul-smelling complicated cellulitis with some superficial necrotic tissue.  Started  on broad-spectrum antibiotics, seen by general surgery and wound care.  Clinically improved throughout his hospital stay.     Assessment:  Severe skin/soft tissue infection, left leg cellulitis  Treated with vancomycin, Zosyn, clindamycin initially  Clinically continues to improve but is a quite complicated wound  Evaluated by general surgery, not felt to need any surgical debridement  Evaluated by wound care, recommended Silvadene, daily dressing changes  Recommend continued elevation while at rest, avoid prolonged standing  Discharged with Levaquin and doxycycline, Silvadene     Dementia  Noted, mild confusion noted here but no agitation or severe delirium     Anemia  Mild, asymptomatic, slight drop might be due to slow bleeding from the left lower extremity wound  WBC and plts ok  Reviewed with cardiology, aspirin and Plavix continued due to only 4 months since stent placement     Htn, CAD, drug-eluting stent placed in May 2021  Continue home bp meds, statin  BP and HR well controlled while here  Continue aspirin and Plavix  Discharge Medications with Med changes:     Current Discharge Medication List      START taking these medications    Details   doxycycline monohydrate (ADOXA) 100 MG tablet Take 1 tablet (100 mg) by mouth 2 times daily for 14 days  Qty: 28 tablet, Refills: 0    Associated Diagnoses: Cellulitis of leg, left      levofloxacin (LEVAQUIN) 500 MG tablet Take 1 tablet (500 mg) by mouth daily for 14 days  Qty: 14 tablet, Refills: 0    Associated Diagnoses: Cellulitis of leg, left      silver sulfADIAZINE (SILVADENE) 1 % external cream Apply topically daily  Qty: 50 g, Refills: 0    Associated Diagnoses: Cellulitis of leg, left         CONTINUE these medications which have CHANGED    Details   aspirin (ASA) 81 MG EC tablet Take 1 tablet (81 mg) by mouth daily    Associated Diagnoses: Cellulitis of leg, left      clopidogrel (PLAVIX) 75 MG tablet Take 1 tablet (75 mg) by mouth At Bedtime    Associated  Diagnoses: Cellulitis of leg, left         CONTINUE these medications which have NOT CHANGED    Details   acetaminophen (TYLENOL EXTRA STRENGTH) 500 MG tablet Take 500-1,000 mg by mouth every 6 hours as needed       atorvastatin (LIPITOR) 80 MG tablet Take 80 mg by mouth At Bedtime      DOCOSAHEXANOIC ACID/EPA (FISH OIL ORAL) [DOCOSAHEXANOIC ACID/EPA (FISH OIL ORAL)] Take 1,500 mg by mouth daily.      donepezil (ARICEPT) 10 MG tablet [DONEPEZIL (ARICEPT) 10 MG TABLET] Take 10 mg by mouth every morning.       finasteride (PROSCAR) 5 mg tablet [FINASTERIDE (PROSCAR) 5 MG TABLET] Take 5 mg by mouth daily.       lisinopril (ZESTRIL) 5 MG tablet Take 5 mg by mouth daily      memantine (NAMENDA) 10 MG tablet Take 10 mg by mouth 2 times daily       metoprolol succinate (TOPROL-XL) 25 MG [METOPROLOL SUCCINATE (TOPROL-XL) 25 MG] Take 25 mg by mouth daily.       nitroglycerin (NITROSTAT) 0.4 MG SL tablet [NITROGLYCERIN (NITROSTAT) 0.4 MG SL TABLET] Place 0.4 mg under the tongue.      tamsulosin (FLOMAX) 0.4 mg Cp24 [TAMSULOSIN (FLOMAX) 0.4 MG CP24] Take 0.4 mg by mouth daily after supper.       vit C/E/Zn/coppr/lutein/zeaxan (PRESERVISION AREDS-2 ORAL) [VIT C/E/ZN/COPPR/LUTEIN/ZEAXAN (PRESERVISION AREDS-2 ORAL)] Take 1 tablet by mouth 2 (two) times a day.         STOP taking these medications       cephALEXin (KEFLEX) 500 MG capsule Comments:   Reason for Stopping:         Lidocaine (LIDOCARE) 4 % Patch Comments:   Reason for Stopping:             Rationale for medication changes:    As above  Consults   General Surgery   Anticoagulation Information    Not applicable  SIGNIFICANT IMAGING FINDINGS     Results for orders placed or performed during the hospital encounter of 09/15/21   CT Tibia Fibula Lower Leg Left wo Contrast    Impression    IMPRESSION:  1.  Large area of nonspecific abnormal tissue in the subcutaneous fat compartment of the anterolateral aspect of the left lower leg. The heterogeneous density indicates there  may be a component of hemorrhage or infection. There is no definite abscess.   This extends down to the superficial muscle fascia, but there is no gross evidence of deep fascial involvement. No soft tissue gas. No CT evidence of necrotizing fasciitis although this cannot be ruled out with CT.  2.  Questionable lateral meniscus tear.  3.  Incidentally noted small knee joint effusion. Mild patellofemoral osteoarthrosis. Moderate atrophy of the distal soleus muscle.       SIGNIFICANT LABORATORY FINDINGS   Hemoglobin 8.5 on day of discharge, stable from 8.92 days prior  White count normal at 5.7  Unremarkable basic metabolic panel  CRP mildly elevated 1.6  Covid negative on 9/15  Normal total CK  Discharge Orders        Home care nursing referral      Reason for your hospital stay    Severe left leg skin infection     Follow-up and recommended labs and tests     Follow up with primary care provider, PAUL RUEDA, within 3 to 5 days, for hospital follow- up. The following labs/tests are recommended: CBC, CRP.     Activity    Your activity upon discharge: Avoid strenuous activities, avoid prolonged standing or walking, keep left leg elevated most of the day.     MD face to face encounter    Documentation of Face to Face and Certification for Home Health Services    I certify that patient: Patrick Green is under my care and that I, or a nurse practitioner or physician's assistant working with me, had a face-to-face encounter that meets the physician face-to-face encounter requirements with this patient on: 9/18/2021.    This encounter with the patient was in whole, or in part, for the following medical condition, which is the primary reason for home health care: Complicated left leg wound infection.    I certify that, based on my findings, the following services are medically necessary home health services: Nursing.    My clinical findings support the need for the above services because: Nurse is needed: To provide  assessment and oversight required in the home to assure adherence to the medical plan due to: Severe, complicated skin infection of the left lower extremity..    Further, I certify that my clinical findings support that this patient is homebound (i.e. absences from home require considerable and taxing effort and are for medical reasons or [employer] services or infrequently or of short duration when for other reasons) because: Requires assistance of another person or specialized equipment to access medical services because patient: Requires supervision of another for safe transfer...    Based on the above findings. I certify that this patient is confined to the home and needs intermittent skilled nursing care, physical therapy and/or speech therapy.  The patient is under my care, and I have initiated the establishment of the plan of care.  This patient will be followed by a physician who will periodically review the plan of care.  Physician/Provider to provide follow up care: Rolando Slaughter    Attending hospital physician (the Medicare certified [employer] provider): Patrick Spivey DO  Physician Signature: See electronic signature associated with these discharge orders.  Date: 9/18/2021     Diet    Follow this diet upon discharge: Orders Placed This Encounter      Advance Diet as Tolerated: Regular Diet Adult     Examination   Physical Exam   Temp:  [97.1  F (36.2  C)-97.2  F (36.2  C)] 97.2  F (36.2  C)  Pulse:  [58-78] 78  Resp:  [16-18] 16  BP: (105-155)/(57-79) 155/79  SpO2:  [96 %-97 %] 97 %  Wt Readings from Last 1 Encounters:   09/15/21 88.5 kg (195 lb)       Subjective: Still some mild left lower extremity pain but feels like is getting better.  Denies chest pain, shortness of breath, abdominal pain, nausea, vomiting, difficulty urinating.  Says that he was having a troubling dream this morning.    Physical Exam:    Gen: no acute distress, comfortable, alert, pleasant, mildly confused  ENT: no scleral  icterus  Pulm: lungs are clear without wheezing, crackles  CV: regular rate and rhythm, no pitting edema  Derm: Necrotic, deep appearing opening on the left anterior proximal shin with purulent drainage, bleeding is mild.  Erythema and warmth that was surrounding the area has receded and is mild currently.  Other areas of the skin lower in the leg seem to be healing well and did not have significant purulence, bleeding or surrounding erythema.  Psych: appropriate affect       Please see EMR for more detailed significant labs, imaging, consultant notes etc.    I, Patrick Spivey DO, personally saw the patient today and spent greater than 30 minutes discharging this patient.    Patrick Spivey DO  [facility]    CC:Rolando Slaughter

## 2021-09-18 NOTE — DISCHARGE INSTRUCTIONS
Left leg wound care daily:  Cleanse left shin with water, gently dry.  Apply thick layer Silvadene to wound bed which is blackened.  Cover that wound and any other open areas with oil emulsion gauze.  Cover with ABD pad and secure with roll gauze.    Home care services have been arrange for you    Home Care Agency: [facility]    Home Care Phone: [phone]    Services: RN for wound care    Instructions: Home care will visit 9/22/21 or later.  Provider will call you to schedule visit.

## 2021-09-18 NOTE — PLAN OF CARE
Problem: Adult Inpatient Plan of Care  Goal: Absence of Hospital-Acquired Illness or Injury  Intervention: Identify and Manage Fall Risk  Recent Flowsheet Documentation  Taken 9/17/2021 2629 by Nubia Vu RN  Safety Promotion/Fall Prevention:   activity supervised   bed alarm on   fall prevention program maintained   clutter free environment maintained   mobility aid in reach   nonskid shoes/slippers when out of bed     Problem: Skin or Soft Tissue Infection  Goal: Infection Symptom Resolution  Outcome: Improving     Bed alarm on for safety, patient disoriented to place and situation at times. Vitals stable. CMS intact to LLE. Dressing with small amount drainage. PRN Tylenol for pain.

## 2021-09-18 NOTE — PROGRESS NOTES
Reviewed AVS over the phone with daughter in law Krishna.   Verifying with MD per family that patients supposed to have plavix and aspirin held until the 25th as they are concerned due to his stent placement.   Family stated they will scheduled follow up appointment as they need to coordinate schedules.

## 2021-09-18 NOTE — PLAN OF CARE
Discharge paperwork reviewed with patient's son at bedside. Family to schedule follow up for transport reasons. IV out. Verbal instruction given for dressing change. Daughter in law is a RN and repeated instructions back to writer and is comfortable with dressing change.

## 2021-09-18 NOTE — PLAN OF CARE
Patient oriented to self only; follows command. Patient sundowning; has dementia. Provided with frequent reorientation. Provided with left leg wound care per wound care nurse recommendation. Tylenol given for pain with relief. Antibiotic administered as ordered. Bed alarm on, frequent safety rounding checks for safety.

## 2021-09-18 NOTE — PROGRESS NOTES
Care Management Discharge Note    Discharge Date: 09/18/2021       Discharge Disposition: Home    Discharge Services: None    Discharge DME: None    Discharge Transportation: family or friend will provide    Private pay costs discussed: Not applicable    PAS Confirmation Code:    Patient/family educated on Medicare website which has current facility and service quality ratings:      Education Provided on the Discharge Plan:    Persons Notified of Discharge Plans: family  Patient/Family in Agreement with the Plan: yes    Handoff Referral Completed: Yes    Additional Information:  11:13 AM  Pt will discharge home in care of family.  Family will provide wound care needs until [facility] can see (9/22/21 or later).  Family will transport home.        MARILIN Zuñiga

## 2021-09-20 LAB
BACTERIA BLD CULT: NO GROWTH
BACTERIA BLD CULT: NO GROWTH

## 2023-07-20 ENCOUNTER — TRANSCRIBE ORDERS (OUTPATIENT)
Dept: OTHER | Age: 83
End: 2023-07-20

## 2023-07-20 DIAGNOSIS — F03.90 MAJOR NEUROCOGNITIVE DISORDER (H): Primary | ICD-10-CM

## 2023-11-15 ENCOUNTER — OFFICE VISIT (OUTPATIENT)
Dept: FAMILY MEDICINE | Facility: CLINIC | Age: 83
End: 2023-11-15
Payer: MEDICARE

## 2023-11-15 VITALS
WEIGHT: 212 LBS | SYSTOLIC BLOOD PRESSURE: 133 MMHG | DIASTOLIC BLOOD PRESSURE: 80 MMHG | TEMPERATURE: 97.4 F | OXYGEN SATURATION: 96 % | BODY MASS INDEX: 31.31 KG/M2 | HEART RATE: 64 BPM | RESPIRATION RATE: 16 BRPM

## 2023-11-15 DIAGNOSIS — S51.812A SKIN TEAR OF LEFT FOREARM WITHOUT COMPLICATION, INITIAL ENCOUNTER: Primary | ICD-10-CM

## 2023-11-15 PROCEDURE — 99203 OFFICE O/P NEW LOW 30 MIN: CPT | Performed by: PHYSICIAN ASSISTANT

## 2023-11-15 NOTE — LETTER
[facility]  [address] [address]  Phone: [phone]  Fax: [phone]    November 15, 2023        Patrick Green  [address] [address]          To whom it may concern:    RE: Patrick Green    Olaf Green is excused from work for 11/15/2023.      Please contact me for questions or concerns.      Sincerely,        Davis Yi PA-C

## 2023-11-15 NOTE — PROGRESS NOTES
Assessment & Plan:      Problem List Items Addressed This Visit    None  Visit Diagnoses       Skin tear of left forearm without complication, initial encounter    -  Primary          Medical Decision Making  Patient presents with a skin tear to the left forearm.  The skin flap tissue is already necrosing, thus trimmed this with a pair of sterile scissors without complication.  A strip of Surgifoam was used over the skin tear to help form a clot.  This was then covered with nonadherent gauze, normal gauze, and held in place with an Ace bandage.  Bleeding was well controlled here at the clinic.  Provided son with extra dressings for daily dressing changes.  Discussed treatment and symptomatic care.  Allergies and medication interactions reviewed.  Discussed signs of worsening symptoms and when to follow-up with PCP if no symptom improvement.     Subjective:      History provided by the son.  Patrick Green is a 83 year old male with history of dementia, here for evaluation of skin tear to the left forearm.  Event of injury occurred yesterday.  Patient and son are unsure of the exact trauma that occurred.  Son noted that the patient was coming in from the garage after grabbing a popsicle and while turning suddenly brought his arm upward and then the bleeding began.  Son suspects patient likely hit the light switch.  Wound has been bleeding ever since.  Patient is currently on Plavix.  Patient denies arm pains.  Son had previously cleaned the wound and applied topical antibiotics and a Band-Aid.     The following portions of the patient's history were reviewed and updated as appropriate: allergies, current medications, and problem list.     Review of Systems  Pertinent items are noted in HPI.    Allergies  Allergies   Allergen Reactions    Diatrizoate Other (See Comments) and Rash     Anaphylactic reaction during 5/17/14 coronary angiogram per patient   Other reaction(s): Hypotension  Anaphylactic reaction during  14 coronary angiogram per patient   Anaphylactic reaction during 14 coronary angiogram per patient       Simvastatin Other (See Comments)     Muscle weakness  Other reaction(s): Muscle Weakness    Zocor [Simvastatin]        Family History   Problem Relation Age of Onset    Coronary Artery Disease Father        Social History     Tobacco Use    Smoking status: Former     Types: Cigarettes     Quit date: 2013     Years since quittin.9    Smokeless tobacco: Never   Substance Use Topics    Alcohol use: Yes     Alcohol/week: 1.0 standard drink of alcohol        Objective:      /80   Pulse 64   Temp 97.4  F (36.3  C) (Oral)   Resp 16   Wt 96.2 kg (212 lb)   SpO2 96%   BMI 31.31 kg/m    General appearance - alert, well appearing, and in no distress and non-toxic  Skin - left forearm: Skin tear about 5 cm in length affecting the middle left forearm with active bleeding seen, no foreign body, no bone or tendon involvement    The use of Dragon/Merus dictation services was used to construct the content of this note; any grammatical errors are non-intentional. Please contact the author directly if you are in need of any clarification.

## 2024-05-29 NOTE — ED TRIAGE NOTES
Patient is here with cellulitis to his left lower leg. He was put on an antibiotic yesterday and it was marked, the redness is out of the lines.   
94

## 2025-01-08 ENCOUNTER — APPOINTMENT (OUTPATIENT)
Dept: RADIOLOGY | Facility: CLINIC | Age: 85
DRG: 178 | End: 2025-01-08
Attending: FAMILY MEDICINE
Payer: MEDICARE

## 2025-01-08 ENCOUNTER — APPOINTMENT (OUTPATIENT)
Dept: CT IMAGING | Facility: CLINIC | Age: 85
DRG: 178 | End: 2025-01-08
Attending: EMERGENCY MEDICINE
Payer: MEDICARE

## 2025-01-08 ENCOUNTER — APPOINTMENT (OUTPATIENT)
Dept: CT IMAGING | Facility: CLINIC | Age: 85
DRG: 178 | End: 2025-01-08
Attending: FAMILY MEDICINE
Payer: MEDICARE

## 2025-01-08 ENCOUNTER — HOSPITAL ENCOUNTER (INPATIENT)
Facility: CLINIC | Age: 85
DRG: 178 | End: 2025-01-08
Attending: EMERGENCY MEDICINE
Payer: MEDICARE

## 2025-01-08 DIAGNOSIS — R06.00 DYSPNEA, UNSPECIFIED TYPE: ICD-10-CM
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
## 2025-01-08 DIAGNOSIS — R06.82 TACHYPNEA: ICD-10-CM
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
## 2025-01-08 PROBLEM — K76.89 LIVER CYST: Status: ACTIVE | Noted: 2019-09-04

## 2025-01-08 PROBLEM — R07.89 CHEST WALL PAIN: Status: ACTIVE | Noted: 2025-01-08

## 2025-01-08 PROBLEM — K80.20 GALLSTONES: Status: ACTIVE | Noted: 2021-05-26

## 2025-01-08 PROBLEM — K56.609 SBO (SMALL BOWEL OBSTRUCTION) (H): Status: ACTIVE | Noted: 2021-05-18

## 2025-01-08 PROBLEM — K82.4 GALLBLADDER POLYP: Status: ACTIVE | Noted: 2019-09-04

## 2025-01-08 PROBLEM — N28.1 RENAL CYST: Status: ACTIVE | Noted: 2019-09-04

## 2025-01-08 LAB
ANION GAP SERPL CALCULATED.3IONS-SCNC: 14 MMOL/L (ref 7–15)
ATRIAL RATE - MUSE: 102 BPM
BASE EXCESS BLDV CALC-SCNC: -1.7 MMOL/L (ref -3–3)
BASOPHILS # BLD AUTO: 0 10E3/UL (ref 0–0.2)
BASOPHILS NFR BLD AUTO: 0 %
BUN SERPL-MCNC: 26.4 MG/DL (ref 8–23)
CALCIUM SERPL-MCNC: 9 MG/DL (ref 8.8–10.4)
CHLORIDE SERPL-SCNC: 103 MMOL/L (ref 98–107)
CREAT SERPL-MCNC: 1.51 MG/DL (ref 0.67–1.17)
DIASTOLIC BLOOD PRESSURE - MUSE: NORMAL MMHG
EGFRCR SERPLBLD CKD-EPI 2021: 45 ML/MIN/1.73M2
EOSINOPHIL # BLD AUTO: 0.2 10E3/UL (ref 0–0.7)
EOSINOPHIL NFR BLD AUTO: 2 %
ERYTHROCYTE [DISTWIDTH] IN BLOOD BY AUTOMATED COUNT: 13.5 % (ref 10–15)
FLUAV RNA SPEC QL NAA+PROBE: NEGATIVE
FLUBV RNA RESP QL NAA+PROBE: NEGATIVE
GLUCOSE SERPL-MCNC: 118 MG/DL (ref 70–99)
HCO3 BLDV-SCNC: 24 MMOL/L (ref 21–28)
HCO3 SERPL-SCNC: 19 MMOL/L (ref 22–29)
HCT VFR BLD AUTO: 39.7 % (ref 40–53)
HGB BLD-MCNC: 13.1 G/DL (ref 13.3–17.7)
IMM GRANULOCYTES # BLD: 0.1 10E3/UL
IMM GRANULOCYTES NFR BLD: 1 %
INTERPRETATION ECG - MUSE: NORMAL
LACTATE SERPL-SCNC: 1.2 MMOL/L (ref 0.7–2)
LYMPHOCYTES # BLD AUTO: 1 10E3/UL (ref 0.8–5.3)
LYMPHOCYTES NFR BLD AUTO: 12 %
MAGNESIUM SERPL-MCNC: 2.2 MG/DL (ref 1.7–2.3)
MCH RBC QN AUTO: 29.1 PG (ref 26.5–33)
MCHC RBC AUTO-ENTMCNC: 33 G/DL (ref 31.5–36.5)
MCV RBC AUTO: 88 FL (ref 78–100)
MONOCYTES # BLD AUTO: 0.7 10E3/UL (ref 0–1.3)
MONOCYTES NFR BLD AUTO: 9 %
NEUTROPHILS # BLD AUTO: 6.2 10E3/UL (ref 1.6–8.3)
NEUTROPHILS NFR BLD AUTO: 76 %
NRBC # BLD AUTO: 0 10E3/UL
NRBC BLD AUTO-RTO: 0 /100
NT-PROBNP SERPL-MCNC: 430 PG/ML (ref 0–1800)
O2/TOTAL GAS SETTING VFR VENT: 21 %
OXYHGB MFR BLDV: 57 % (ref 70–75)
P AXIS - MUSE: 63 DEGREES
PCO2 BLDV: 42 MM HG (ref 40–50)
PH BLDV: 7.36 [PH] (ref 7.32–7.43)
PLATELET # BLD AUTO: 156 10E3/UL (ref 150–450)
PO2 BLDV: 34 MM HG (ref 25–47)
POTASSIUM SERPL-SCNC: 4.7 MMOL/L (ref 3.4–5.3)
PR INTERVAL - MUSE: 146 MS
QRS DURATION - MUSE: 88 MS
QT - MUSE: 328 MS
QTC - MUSE: 427 MS
R AXIS - MUSE: -31 DEGREES
RBC # BLD AUTO: 4.5 10E6/UL (ref 4.4–5.9)
RSV RNA SPEC NAA+PROBE: NEGATIVE
SAO2 % BLDV: 58.1 % (ref 70–75)
SARS-COV-2 RNA RESP QL NAA+PROBE: NEGATIVE
SODIUM SERPL-SCNC: 136 MMOL/L (ref 135–145)
SYSTOLIC BLOOD PRESSURE - MUSE: NORMAL MMHG
T AXIS - MUSE: 63 DEGREES
TROPONIN T SERPL HS-MCNC: 25 NG/L
VENTRICULAR RATE- MUSE: 102 BPM
WBC # BLD AUTO: 8.2 10E3/UL (ref 4–11)

## 2025-01-08 PROCEDURE — 93005 ELECTROCARDIOGRAM TRACING: CPT

## 2025-01-08 PROCEDURE — 99285 EMERGENCY DEPT VISIT HI MDM: CPT | Mod: 25

## 2025-01-08 PROCEDURE — 71046 X-RAY EXAM CHEST 2 VIEWS: CPT

## 2025-01-08 PROCEDURE — 93005 ELECTROCARDIOGRAM TRACING: CPT | Performed by: EMERGENCY MEDICINE

## 2025-01-08 PROCEDURE — 70450 CT HEAD/BRAIN W/O DYE: CPT

## 2025-01-08 PROCEDURE — 87637 SARSCOV2&INF A&B&RSV AMP PRB: CPT | Performed by: EMERGENCY MEDICINE

## 2025-01-08 PROCEDURE — 83605 ASSAY OF LACTIC ACID: CPT | Performed by: FAMILY MEDICINE

## 2025-01-08 PROCEDURE — 250N000013 HC RX MED GY IP 250 OP 250 PS 637: Performed by: EMERGENCY MEDICINE

## 2025-01-08 PROCEDURE — 36415 COLL VENOUS BLD VENIPUNCTURE: CPT | Performed by: FAMILY MEDICINE

## 2025-01-08 PROCEDURE — 83735 ASSAY OF MAGNESIUM: CPT | Performed by: FAMILY MEDICINE

## 2025-01-08 PROCEDURE — 80048 BASIC METABOLIC PNL TOTAL CA: CPT | Performed by: FAMILY MEDICINE

## 2025-01-08 PROCEDURE — 71250 CT THORAX DX C-: CPT

## 2025-01-08 PROCEDURE — 85004 AUTOMATED DIFF WBC COUNT: CPT | Performed by: FAMILY MEDICINE

## 2025-01-08 PROCEDURE — 84484 ASSAY OF TROPONIN QUANT: CPT | Performed by: FAMILY MEDICINE

## 2025-01-08 PROCEDURE — 83880 ASSAY OF NATRIURETIC PEPTIDE: CPT | Performed by: FAMILY MEDICINE

## 2025-01-08 PROCEDURE — 82805 BLOOD GASES W/O2 SATURATION: CPT | Performed by: FAMILY MEDICINE

## 2025-01-08 PROCEDURE — 72125 CT NECK SPINE W/O DYE: CPT

## 2025-01-08 PROCEDURE — 85041 AUTOMATED RBC COUNT: CPT | Performed by: FAMILY MEDICINE

## 2025-01-08 RX ORDER — QUETIAPINE FUMARATE 25 MG/1
25 TABLET, FILM COATED ORAL ONCE
Status: COMPLETED | OUTPATIENT
Start: 2025-01-08 | End: 2025-01-08

## 2025-01-08 RX ADMIN — QUETIAPINE FUMARATE 25 MG: 25 TABLET ORAL at 23:13

## 2025-01-08 ASSESSMENT — ACTIVITIES OF DAILY LIVING (ADL)
ADLS_ACUITY_SCORE: 47

## 2025-01-08 ASSESSMENT — COLUMBIA-SUICIDE SEVERITY RATING SCALE - C-SSRS
2. HAVE YOU ACTUALLY HAD ANY THOUGHTS OF KILLING YOURSELF IN THE PAST MONTH?: NO
6. HAVE YOU EVER DONE ANYTHING, STARTED TO DO ANYTHING, OR PREPARED TO DO ANYTHING TO END YOUR LIFE?: NO
6. HAVE YOU EVER DONE ANYTHING, STARTED TO DO ANYTHING, OR PREPARED TO DO ANYTHING TO END YOUR LIFE?: NO
1. IN THE PAST MONTH, HAVE YOU WISHED YOU WERE DEAD OR WISHED YOU COULD GO TO SLEEP AND NOT WAKE UP?: NO
2. HAVE YOU ACTUALLY HAD ANY THOUGHTS OF KILLING YOURSELF IN THE PAST MONTH?: NO
1. IN THE PAST MONTH, HAVE YOU WISHED YOU WERE DEAD OR WISHED YOU COULD GO TO SLEEP AND NOT WAKE UP?: NO

## 2025-01-09 ENCOUNTER — APPOINTMENT (OUTPATIENT)
Dept: SPEECH THERAPY | Facility: CLINIC | Age: 85
DRG: 178 | End: 2025-01-09
Payer: MEDICARE

## 2025-01-09 ENCOUNTER — APPOINTMENT (OUTPATIENT)
Dept: CARDIOLOGY | Facility: CLINIC | Age: 85
DRG: 178 | End: 2025-01-09
Payer: MEDICARE

## 2025-01-09 ENCOUNTER — APPOINTMENT (OUTPATIENT)
Dept: PHYSICAL THERAPY | Facility: CLINIC | Age: 85
DRG: 178 | End: 2025-01-09
Payer: MEDICARE

## 2025-01-09 ENCOUNTER — APPOINTMENT (OUTPATIENT)
Dept: OCCUPATIONAL THERAPY | Facility: CLINIC | Age: 85
DRG: 178 | End: 2025-01-09
Payer: MEDICARE

## 2025-01-09 VITALS
TEMPERATURE: 98 F | SYSTOLIC BLOOD PRESSURE: 117 MMHG | DIASTOLIC BLOOD PRESSURE: 69 MMHG | HEIGHT: 69 IN | OXYGEN SATURATION: 92 % | WEIGHT: 230 LBS | RESPIRATION RATE: 20 BRPM | HEART RATE: 81 BPM | BODY MASS INDEX: 34.07 KG/M2
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
## 2025-01-09 PROBLEM — R06.82 TACHYPNEA: Status: ACTIVE | Noted: 2025-01-09

## 2025-01-09 PROBLEM — R06.00 DYSPNEA, UNSPECIFIED TYPE: Status: ACTIVE | Noted: 2025-01-09

## 2025-01-09 LAB
ALBUMIN UR-MCNC: 30 MG/DL
ANION GAP SERPL CALCULATED.3IONS-SCNC: 14 MMOL/L (ref 7–15)
ANION GAP SERPL CALCULATED.3IONS-SCNC: 14 MMOL/L (ref 7–15)
APPEARANCE UR: CLEAR
BACTERIA BLD CULT: NORMAL
BACTERIA BLD CULT: NORMAL
BILIRUB UR QL STRIP: NEGATIVE
BUN SERPL-MCNC: 26.4 MG/DL (ref 8–23)
BUN SERPL-MCNC: 26.7 MG/DL (ref 8–23)
CALCIUM SERPL-MCNC: 8.4 MG/DL (ref 8.8–10.4)
CALCIUM SERPL-MCNC: 8.7 MG/DL (ref 8.8–10.4)
CHLORIDE SERPL-SCNC: 102 MMOL/L (ref 98–107)
CHLORIDE SERPL-SCNC: 102 MMOL/L (ref 98–107)
COLOR UR AUTO: YELLOW
CREAT SERPL-MCNC: 1.49 MG/DL (ref 0.67–1.17)
CREAT SERPL-MCNC: 1.6 MG/DL (ref 0.67–1.17)
CRP SERPL-MCNC: 13.4 MG/L
EGFRCR SERPLBLD CKD-EPI 2021: 42 ML/MIN/1.73M2
EGFRCR SERPLBLD CKD-EPI 2021: 45 ML/MIN/1.73M2
ERYTHROCYTE [DISTWIDTH] IN BLOOD BY AUTOMATED COUNT: 13.6 % (ref 10–15)
GLUCOSE SERPL-MCNC: 103 MG/DL (ref 70–99)
GLUCOSE SERPL-MCNC: 103 MG/DL (ref 70–99)
GLUCOSE UR STRIP-MCNC: NEGATIVE MG/DL
HCO3 SERPL-SCNC: 20 MMOL/L (ref 22–29)
HCO3 SERPL-SCNC: 21 MMOL/L (ref 22–29)
HCT VFR BLD AUTO: 35.7 % (ref 40–53)
HGB BLD-MCNC: 12 G/DL (ref 13.3–17.7)
HGB UR QL STRIP: ABNORMAL
KETONES UR STRIP-MCNC: NEGATIVE MG/DL
LEUKOCYTE ESTERASE UR QL STRIP: NEGATIVE
MCH RBC QN AUTO: 29.3 PG (ref 26.5–33)
MCHC RBC AUTO-ENTMCNC: 33.6 G/DL (ref 31.5–36.5)
MCV RBC AUTO: 87 FL (ref 78–100)
MUCOUS THREADS #/AREA URNS LPF: PRESENT /LPF
NITRATE UR QL: NEGATIVE
PH UR STRIP: 5.5 [PH] (ref 5–7)
PLATELET # BLD AUTO: 138 10E3/UL (ref 150–450)
POTASSIUM SERPL-SCNC: 3.4 MMOL/L (ref 3.4–5.3)
POTASSIUM SERPL-SCNC: 3.8 MMOL/L (ref 3.4–5.3)
PROCALCITONIN SERPL IA-MCNC: 0.21 NG/ML
RBC # BLD AUTO: 4.09 10E6/UL (ref 4.4–5.9)
RBC URINE: 2 /HPF
SODIUM SERPL-SCNC: 136 MMOL/L (ref 135–145)
SODIUM SERPL-SCNC: 137 MMOL/L (ref 135–145)
SP GR UR STRIP: 1.02 (ref 1–1.03)
TROPONIN T SERPL HS-MCNC: 27 NG/L
TROPONIN T SERPL HS-MCNC: 31 NG/L
UROBILINOGEN UR STRIP-MCNC: <2 MG/DL
WBC # BLD AUTO: 7.3 10E3/UL (ref 4–11)
WBC URINE: <1 /HPF

## 2025-01-09 PROCEDURE — 99222 1ST HOSP IP/OBS MODERATE 55: CPT | Mod: AI

## 2025-01-09 PROCEDURE — 97116 GAIT TRAINING THERAPY: CPT | Mod: GP

## 2025-01-09 PROCEDURE — 97162 PT EVAL MOD COMPLEX 30 MIN: CPT | Mod: GP

## 2025-01-09 PROCEDURE — 97530 THERAPEUTIC ACTIVITIES: CPT | Mod: GP

## 2025-01-09 PROCEDURE — 84484 ASSAY OF TROPONIN QUANT: CPT | Performed by: FAMILY MEDICINE

## 2025-01-09 PROCEDURE — 250N000011 HC RX IP 250 OP 636

## 2025-01-09 PROCEDURE — 84484 ASSAY OF TROPONIN QUANT: CPT

## 2025-01-09 PROCEDURE — 84145 PROCALCITONIN (PCT): CPT

## 2025-01-09 PROCEDURE — 36415 COLL VENOUS BLD VENIPUNCTURE: CPT

## 2025-01-09 PROCEDURE — 250N000013 HC RX MED GY IP 250 OP 250 PS 637

## 2025-01-09 PROCEDURE — 93306 TTE W/DOPPLER COMPLETE: CPT | Mod: 26 | Performed by: INTERNAL MEDICINE

## 2025-01-09 PROCEDURE — 36415 COLL VENOUS BLD VENIPUNCTURE: CPT | Performed by: FAMILY MEDICINE

## 2025-01-09 PROCEDURE — 97166 OT EVAL MOD COMPLEX 45 MIN: CPT | Mod: GO

## 2025-01-09 PROCEDURE — 86140 C-REACTIVE PROTEIN: CPT

## 2025-01-09 PROCEDURE — 120N000001 HC R&B MED SURG/OB

## 2025-01-09 PROCEDURE — 85041 AUTOMATED RBC COUNT: CPT

## 2025-01-09 PROCEDURE — 999N000157 HC STATISTIC RCP TIME EA 10 MIN

## 2025-01-09 PROCEDURE — 94640 AIRWAY INHALATION TREATMENT: CPT

## 2025-01-09 PROCEDURE — 81001 URINALYSIS AUTO W/SCOPE: CPT

## 2025-01-09 PROCEDURE — 92610 EVALUATE SWALLOWING FUNCTION: CPT | Mod: GN

## 2025-01-09 PROCEDURE — 93306 TTE W/DOPPLER COMPLETE: CPT

## 2025-01-09 PROCEDURE — 80048 BASIC METABOLIC PNL TOTAL CA: CPT

## 2025-01-09 PROCEDURE — 85014 HEMATOCRIT: CPT

## 2025-01-09 PROCEDURE — 250N000009 HC RX 250

## 2025-01-09 PROCEDURE — 97535 SELF CARE MNGMENT TRAINING: CPT | Mod: GO

## 2025-01-09 PROCEDURE — 82374 ASSAY BLOOD CARBON DIOXIDE: CPT

## 2025-01-09 PROCEDURE — 87040 BLOOD CULTURE FOR BACTERIA: CPT

## 2025-01-09 RX ORDER — QUETIAPINE FUMARATE 25 MG/1
25 TABLET, FILM COATED ORAL
Status: DISCONTINUED | OUTPATIENT
Start: 2025-01-09 | End: 2025-01-13 | Stop reason: HOSPADM

## 2025-01-09 RX ORDER — ACETAMINOPHEN 650 MG/1
650 SUPPOSITORY RECTAL EVERY 4 HOURS PRN
Status: DISCONTINUED | OUTPATIENT
Start: 2025-01-09 | End: 2025-01-09

## 2025-01-09 RX ORDER — TAMSULOSIN HYDROCHLORIDE 0.4 MG/1
0.4 CAPSULE ORAL
Status: DISCONTINUED | OUTPATIENT
Start: 2025-01-09 | End: 2025-01-13 | Stop reason: HOSPADM

## 2025-01-09 RX ORDER — ACETAMINOPHEN 325 MG/1
650 TABLET ORAL EVERY 4 HOURS PRN
Status: DISCONTINUED | OUTPATIENT
Start: 2025-01-09 | End: 2025-01-13 | Stop reason: HOSPADM

## 2025-01-09 RX ORDER — ONDANSETRON 2 MG/ML
4 INJECTION INTRAMUSCULAR; INTRAVENOUS EVERY 6 HOURS PRN
Status: DISCONTINUED | OUTPATIENT
Start: 2025-01-09 | End: 2025-01-13 | Stop reason: HOSPADM

## 2025-01-09 RX ORDER — ONDANSETRON 4 MG/1
4 TABLET, ORALLY DISINTEGRATING ORAL EVERY 6 HOURS PRN
Status: DISCONTINUED | OUTPATIENT
Start: 2025-01-09 | End: 2025-01-13 | Stop reason: HOSPADM

## 2025-01-09 RX ORDER — FUROSEMIDE 10 MG/ML
20 INJECTION INTRAMUSCULAR; INTRAVENOUS ONCE
Status: COMPLETED | OUTPATIENT
Start: 2025-01-09 | End: 2025-01-09

## 2025-01-09 RX ORDER — IPRATROPIUM BROMIDE AND ALBUTEROL SULFATE 2.5; .5 MG/3ML; MG/3ML
3 SOLUTION RESPIRATORY (INHALATION) EVERY 4 HOURS PRN
Status: DISCONTINUED | OUTPATIENT
Start: 2025-01-09 | End: 2025-01-13 | Stop reason: HOSPADM

## 2025-01-09 RX ORDER — PROCHLORPERAZINE MALEATE 5 MG/1
5 TABLET ORAL EVERY 6 HOURS PRN
Status: DISCONTINUED | OUTPATIENT
Start: 2025-01-09 | End: 2025-01-13 | Stop reason: HOSPADM

## 2025-01-09 RX ORDER — CEFTRIAXONE 2 G/1
2 INJECTION, POWDER, FOR SOLUTION INTRAMUSCULAR; INTRAVENOUS EVERY 24 HOURS
Status: DISCONTINUED | OUTPATIENT
Start: 2025-01-09 | End: 2025-01-09

## 2025-01-09 RX ORDER — MEMANTINE HYDROCHLORIDE 5 MG/1
10 TABLET ORAL 2 TIMES DAILY
Status: DISCONTINUED | OUTPATIENT
Start: 2025-01-09 | End: 2025-01-13 | Stop reason: HOSPADM

## 2025-01-09 RX ORDER — LIDOCAINE 50 MG/G
PATCH TOPICAL DAILY PRN
COMMUNITY

## 2025-01-09 RX ORDER — FINASTERIDE 5 MG/1
5 TABLET, FILM COATED ORAL DAILY
Status: DISCONTINUED | OUTPATIENT
Start: 2025-01-09 | End: 2025-01-13 | Stop reason: HOSPADM

## 2025-01-09 RX ORDER — ACETAMINOPHEN 325 MG/1
650 TABLET ORAL EVERY 4 HOURS PRN
Status: DISCONTINUED | OUTPATIENT
Start: 2025-01-09 | End: 2025-01-09

## 2025-01-09 RX ORDER — AMOXICILLIN 250 MG
2 CAPSULE ORAL 2 TIMES DAILY PRN
Status: DISCONTINUED | OUTPATIENT
Start: 2025-01-09 | End: 2025-01-13 | Stop reason: HOSPADM

## 2025-01-09 RX ORDER — LIDOCAINE 4 G/G
1 PATCH TOPICAL DAILY PRN
Status: DISCONTINUED | OUTPATIENT
Start: 2025-01-09 | End: 2025-01-13 | Stop reason: HOSPADM

## 2025-01-09 RX ORDER — LIDOCAINE 40 MG/G
CREAM TOPICAL
Status: DISCONTINUED | OUTPATIENT
Start: 2025-01-09 | End: 2025-01-13 | Stop reason: HOSPADM

## 2025-01-09 RX ORDER — ASPIRIN 81 MG/1
81 TABLET ORAL DAILY
Status: DISCONTINUED | OUTPATIENT
Start: 2025-01-09 | End: 2025-01-13 | Stop reason: HOSPADM

## 2025-01-09 RX ORDER — IBUPROFEN 200 MG
600 TABLET ORAL EVERY 8 HOURS PRN
COMMUNITY

## 2025-01-09 RX ORDER — METOPROLOL SUCCINATE 25 MG/1
25 TABLET, EXTENDED RELEASE ORAL DAILY
Status: DISCONTINUED | OUTPATIENT
Start: 2025-01-09 | End: 2025-01-13 | Stop reason: HOSPADM

## 2025-01-09 RX ORDER — ENOXAPARIN SODIUM 100 MG/ML
40 INJECTION SUBCUTANEOUS EVERY 24 HOURS
Status: DISCONTINUED | OUTPATIENT
Start: 2025-01-09 | End: 2025-01-13 | Stop reason: HOSPADM

## 2025-01-09 RX ORDER — AMOXICILLIN 250 MG
1 CAPSULE ORAL 2 TIMES DAILY PRN
Status: DISCONTINUED | OUTPATIENT
Start: 2025-01-09 | End: 2025-01-13 | Stop reason: HOSPADM

## 2025-01-09 RX ORDER — ACETAMINOPHEN 650 MG/1
650 SUPPOSITORY RECTAL EVERY 4 HOURS PRN
Status: DISCONTINUED | OUTPATIENT
Start: 2025-01-09 | End: 2025-01-13 | Stop reason: HOSPADM

## 2025-01-09 RX ADMIN — ACETAMINOPHEN 650 MG: 325 TABLET ORAL at 03:37

## 2025-01-09 RX ADMIN — ASPIRIN 81 MG: 81 TABLET, COATED ORAL at 17:16

## 2025-01-09 RX ADMIN — TAMSULOSIN HYDROCHLORIDE 0.4 MG: 0.4 CAPSULE ORAL at 17:16

## 2025-01-09 RX ADMIN — FUROSEMIDE 20 MG: 10 INJECTION, SOLUTION INTRAMUSCULAR; INTRAVENOUS at 02:26

## 2025-01-09 RX ADMIN — QUETIAPINE FUMARATE 25 MG: 25 TABLET ORAL at 22:28

## 2025-01-09 RX ADMIN — CEFTRIAXONE SODIUM 2 G: 2 INJECTION, POWDER, FOR SOLUTION INTRAMUSCULAR; INTRAVENOUS at 02:35

## 2025-01-09 RX ADMIN — FINASTERIDE 5 MG: 5 TABLET, FILM COATED ORAL at 17:16

## 2025-01-09 RX ADMIN — ACETAMINOPHEN 650 MG: 325 TABLET ORAL at 09:17

## 2025-01-09 RX ADMIN — IPRATROPIUM BROMIDE AND ALBUTEROL SULFATE 3 ML: .5; 3 SOLUTION RESPIRATORY (INHALATION) at 03:10

## 2025-01-09 RX ADMIN — ACETAMINOPHEN 650 MG: 325 TABLET ORAL at 20:16

## 2025-01-09 RX ADMIN — ENOXAPARIN SODIUM 40 MG: 40 INJECTION SUBCUTANEOUS at 03:37

## 2025-01-09 RX ADMIN — METOPROLOL SUCCINATE 25 MG: 25 TABLET, EXTENDED RELEASE ORAL at 17:16

## 2025-01-09 RX ADMIN — MEMANTINE 10 MG: 10 TABLET ORAL at 20:18

## 2025-01-09 ASSESSMENT — ACTIVITIES OF DAILY LIVING (ADL)
ADLS_ACUITY_SCORE: 47
ADLS_ACUITY_SCORE: 47
ADLS_ACUITY_SCORE: 49
ADLS_ACUITY_SCORE: 56
ADLS_ACUITY_SCORE: 61
ADLS_ACUITY_SCORE: 61
ADLS_ACUITY_SCORE: 49
ADLS_ACUITY_SCORE: 56
ADLS_ACUITY_SCORE: 49
ADLS_ACUITY_SCORE: 61
ADLS_ACUITY_SCORE: 56
ADLS_ACUITY_SCORE: 47
ADLS_ACUITY_SCORE: 56
DEPENDENT_IADLS:: CLEANING;COOKING;LAUNDRY;SHOPPING;MEAL PREPARATION;MEDICATION MANAGEMENT;TRANSPORTATION
ADLS_ACUITY_SCORE: 49
ADLS_ACUITY_SCORE: 62
ADLS_ACUITY_SCORE: 49
ADLS_ACUITY_SCORE: 56
ADLS_ACUITY_SCORE: 56
ADLS_ACUITY_SCORE: 49

## 2025-01-09 NOTE — PHARMACY-ADMISSION MEDICATION HISTORY
Pharmacy Intern Admission Medication History    Admission medication history is complete. The information provided in this note is only as accurate as the sources available at the time of the update.    Information Source(s): Patient and CareEverywhere/SureScripts via in-person    Per phone calls/notes, appears issues filling pt's memantine in November.  Prior to this, was BID, most recent fill was for 10 mg qday.  Did not see documentation to support dose decrease.  Will leave at 10 mg BID for now    Pertinent Information:   - Spoke with patients vicMilkaJose Antonio. Per Jose Antonio, patient has a home aid to take care of his medications daily but son was able to recall most of his maintenance medications but not his as needed medications.   - Jose Antonio reported the patient not taking any medications after 1600 on 1/8/25.   - Jose Antonio also reported he was unsure of when he last took any PRN medications before coming to the ED.     Changes made to PTA medication list:  Added:   Ibuprofen 600 mg   Lidocaine 5% patches   Deleted:   Plavix 75 mg   Donepezil 10 mg   Ferrous Sulfate 325 mg   Lisinopril 5 mg   Silver sulfadiazine 1% cream   Preservision   Changed: None    Allergies reviewed with patient and updates made in EHR: yes    PTA Med List   Medication Sig Note Last Dose/Taking    acetaminophen (TYLENOL EXTRA STRENGTH) 500 MG tablet Take 500-1,000 mg by mouth every 6 hours as needed   Unknown    aspirin (ASA) 81 MG EC tablet Take 1 tablet (81 mg) by mouth daily  1/8/2025 Morning    atorvastatin (LIPITOR) 80 MG tablet Take 80 mg by mouth At Bedtime  1/7/2025 Evening    DOCOSAHEXANOIC ACID/EPA (FISH OIL ORAL) [DOCOSAHEXANOIC ACID/EPA (FISH OIL ORAL)] Take 1,500 mg by mouth daily. 1/9/2025: Patient has at home but does not take regularly  Unknown    finasteride (PROSCAR) 5 mg tablet [FINASTERIDE (PROSCAR) 5 MG TABLET] Take 5 mg by mouth daily.   1/8/2025 Morning    ibuprofen (ADVIL/MOTRIN) 200 MG tablet Take 600 mg by mouth every 8 hours  as needed for pain (for back pain). 1/9/2025: Takes 1-2 doses a week for back pain Unknown    lidocaine (LIDODERM) 5 % patch Place onto the skin daily as needed for moderate pain (back pain). To prevent lidocaine toxicity, patient should be patch free for 12 hrs daily.  Unknown    memantine (NAMENDA) 10 MG tablet Take 10 mg by mouth 2 times daily   1/8/2025 Morning    metoprolol succinate (TOPROL-XL) 25 MG [METOPROLOL SUCCINATE (TOPROL-XL) 25 MG] Take 25 mg by mouth daily.   1/8/2025 Morning    nitroglycerin (NITROSTAT) 0.4 MG SL tablet [NITROGLYCERIN (NITROSTAT) 0.4 MG SL TABLET] Place 0.4 mg under the tongue. 1/9/2025: Patient has at home but has not needed it in years Unknown    tamsulosin (FLOMAX) 0.4 mg Cp24 [TAMSULOSIN (FLOMAX) 0.4 MG CP24] Take 0.4 mg by mouth daily after supper.   1/7/2025 Evening       Medications Available during hospital stay: NONE    Medication History Completed By: Rachel Bonds  1/9/2025 9:41 AM

## 2025-01-09 NOTE — PROGRESS NOTES
"Speech-Language Pathology: Clinical Swallow Evaluation      01/09/25 1100   Appointment Info   Signing Clinician's Name / Credentials (SLP) Carolyne Sharma MA, CCC-SLP   General Information   Onset of Illness/Injury or Date of Surgery 01/08/25   Referring Physician Genie Ojeda MD   Pertinent History of Current Problem   Per H&P note, \"Patrick Grene is a 84 year old male admitted on 1/8/2025. He has a history of Alzheimer's dementia, afib status post cardioversion not on anticoagulation, NSTEMI status post stents, coronary artery disease, BPH, CKD3 and is admitted for tachypnea and fall, also found to have new fever.\"     Speech Therapy was consulted due to concerns for dysphagia with possible aspiration. Per nursing notes, \"Pt is coughing and trying to cough up phlegm, appears to be choking on phlegm, attempted to use yankauer to clear secretions and raised HOB 45 degrees.\"     General Observations Patient alert and cooperative. Pleasantly confused.   Type of Evaluation   Type of Evaluation Swallow Evaluation   Oral Motor   Oral Musculature generally intact   Structural Abnormalities none present   Mucosal Quality adequate   Dentition (Oral Motor)   Comment, Dentition (Oral Motor) Patient's dentition appears to be in good condition for his age.   Dentition (Oral Motor) natural dentition   Facial Symmetry (Oral Motor)   Facial Symmetry (Oral Motor) WNL   Lip Function (Oral Motor)   Lip Range of Motion (Oral Motor) WNL   Tongue Function (Oral Motor)   Tongue ROM (Oral Motor) WNL   Jaw Function (Oral Motor)   Jaw Function (Oral Motor) WNL   Cough/Swallow/Gag Reflex (Oral Motor)   Volitional Throat Clear/Cough (Oral Motor) unable/difficult to assess   Volitional Swallow (Oral Motor) unable/difficult to assess   Vocal Quality/Secretion Management (Oral Motor)   Vocal Quality (Oral Motor) WNL   Secretion Management (Oral Motor) WNL  (No phlegm/secretions noted during session)   Comment, Vocal Quality/Secretion Management " (Oral Motor) Patient's vocal quality was clear. No coughing noted during this session.   General Swallowing Observations   Past History of Dysphagia None per patient report. None per cursory review of EMR.   Respiratory Support room air   Current Diet/Method of Nutritional Intake (General Swallowing Observations, NIS) regular diet;thin liquids (level 0)   Swallowing Evaluation Clinical swallow evaluation   Clinical Swallow Evaluation   Feeding Assistance no assistance needed   Clinical Swallow Evaluation Textures Trialed thin liquids;solid foods   Clinical Swallow Eval: Thin Liquid Texture Trial   Mode of Presentation, Thin Liquids cup;straw;self-fed   Volume of Liquid or Food Presented >4 ounces   Oral Phase of Swallow WFL   Pharyngeal Phase of Swallow intact   Diagnostic Statement Subjectively, swallow response appeared timely. Laryngeal elevation was visualized. No overt clinical s/sx of aspiration observed.   Clinical Swallow Evaluation: Solid Food Texture Trial   Mode of Presentation self-fed   Volume Presented Multiple bites  (1 banana, 1/2 winston cracker)   Oral Phase WFL   Pharyngeal Phase intact   Diagnostic Statement Patient demonstrated effective and timely mastication and good oral clearance. No overt clinical s/sx of aspiration observed.   Esophageal Phase of Swallow   Patient reports or presents with symptoms of esophageal dysphagia No   Swallowing Recommendations   Diet Consistency Recommendations regular diet;thin liquids (level 0)   Supervision Level for Intake patient independent   Mode of Delivery Recommendations bolus size, small;slow rate of intake   Monitoring/Assistance Required (Eating/Swallowing) stop eating activities when fatigue is present   Recommended Feeding/Eating Techniques (Swallow Eval) maintain upright sitting position for eating;minimize distractions during oral intake   Medication Administration Recommendations, Swallowing (SLP) As tolerated   Instrumental Assessment  Recommendations instrumental evaluation not recommended at this time   Clinical Impression   Criteria for Skilled Therapeutic Interventions Met (SLP Eval) Evaluation only   Clinical Impression Comments Clinical swallow evaluation completed per provider order. No oral dysphagia observed with the consistencies given. There was no direct evidence of pharyngeal dysphagia during this evaluation. Patient appears appropriate to continue current diet of Regular textures and thin liquids with use of the safe swallowing precautions listed above. There is no indication for ongoing skilled Speech Therapy services at this time. Will sign off, but remain available if new concerns arise.   SLP Total Evaluation Time   Eval: oral/pharyngeal swallow function, clinical swallow Minutes (54867) 10   SLP Discharge Planning   SLP Plan Further Speech Therapy is not indicated at this time. Will sign off and complete current order. Please re-consult if new concerns arise.   SLP Discharge Recommendation other (see comments)  (Defer to medical team)   SLP Rationale for DC Rec Patient's swallow function appears at or near baseline.   SLP Brief overview of current status  Patient appears appropriate to continue current diet of Regular textures and thin liquids. To maximize safety of oral intake, he should sit fully upright (preferably in chair) for all intake, eat slowly, take small bites/sips, and chew foods thoroughly.   SLP Time and Intention   Total Session Time (sum of timed and untimed services) 10

## 2025-01-09 NOTE — ED NOTES
Pt is coughing and trying to cough up phlegm, appears to be choking on phlegm, attempted to use yankauer to clear secretions and raised HOB 45 degrees.  Pt became tachyphneic and audibly wheezy, paged resident

## 2025-01-09 NOTE — ED TRIAGE NOTES
Pt arrives to ED with daughter in law with c/o unwitnessed fall that occurred around 1730. Pt has dementia poor historian. Not on thinners but aspirin.  Family states he hit head due to his glasses being found far away from his face. Pt denies any pain. Pt has has generalized weakness since this morning. Pt has an aid come 4 days a week and states today was unable to get him out of his chair. Pt is breathing heavily in triage but denies any shortness of breath or chest pain. CAD hx and hx of MI. Family states she had a hard time getting him into the car as well which is unusually for pt because usually he lives independent at home.      Triage Assessment (Adult)       Row Name 01/08/25 6178          Triage Assessment    Airway WDL WDL        Respiratory WDL    Respiratory WDL WDL        Skin Circulation/Temperature WDL    Skin Circulation/Temperature WDL WDL        Cardiac WDL    Cardiac WDL WDL        Peripheral/Neurovascular WDL    Peripheral Neurovascular WDL WDL        Cognitive/Neuro/Behavioral WDL    Cognitive/Neuro/Behavioral WDL WDL

## 2025-01-09 NOTE — ED NOTES
Resident at bs, updated on pt status  Temp 100.5 orally  Increased tachypnea and wheezing with any exertion  Orders received-BC ordered, antibiotic and neb

## 2025-01-09 NOTE — ED NOTES
RR decreased from 40's to low 30's, still some audible wheezing but less distressed  RT to give neb  Straight cathed for UA, 600 bozena urine emptied and external cath placed   VSS, NSR 80's  Report to TERESSA Matute

## 2025-01-09 NOTE — PROGRESS NOTES
01/09/25 1310   Appointment Info   Signing Clinician's Name / Credentials (OT) Ivette Rachel OTD OTR/L   Living Environment   People in Home alone   Current Living Arrangements house   Living Environment Comments Pt unreliable historian - unable to gather home set up   Self-Care   Equipment Currently Used at Home none  (per pt)   Activity/Exercise/Self-Care Comment Per chart review pt lives alone   General Information   Onset of Illness/Injury or Date of Surgery 01/08/25   Referring Physician Shweta Jha MD   Patient/Family Therapy Goal Statement (OT) To return home   Additional Occupational Profile Info/Pertinent History of Current Problem Patrick Green is a 84 year old male admitted on 1/8/2025. He has a history of Alzheimer's dementia, afib status post cardioversion not on anticoagulation, NSTEMI status post stents, coronary artery disease, BPH, CKD3 and is admitted for tachypnea and fall, also found to have new fever.   Existing Precautions/Restrictions fall   Limitations/Impairments safety/cognitive   Cognitive Status Examination   Orientation Status person   Affect/Mental Status (Cognitive) confused   Follows Commands increased processing time needed;repetition of directions required;verbal cues/prompting required;physical/tactile prompts required   Safety Deficit awareness of need for assistance;problem-solving;impulsivity   Cognitive Status Comments Pt reporting he lives with son and wife - per chart inaccurate, unaware he was in hospital   Visual Perception   Visual Impairment/Limitations corrective lenses full-time   Posture   Posture not impaired   Range of Motion Comprehensive   General Range of Motion bilateral upper extremity ROM WFL   Strength Comprehensive (MMT)   Comment, General Manual Muscle Testing (MMT) Assessment Min generalized weakness   Bed Mobility   Bed Mobility supine-sit;sit-supine   Supine-Sit [place] (Bed Mobility) moderate assist (50% patient effort)   Transfers   Transfers  sit-stand transfer;toilet transfer   Sit-Stand Transfer   Sit-Stand [place] (Transfers) minimum assist (75% patient effort)   Toilet Transfer   [place] Level (Toilet Transfer) minimum assist (75% patient effort)   Balance   Balance Assessment standing dynamic balance   Standing Balance: Dynamic minimal assist   Activities of Daily Living   BADL Assessment/Intervention lower body dressing;toileting;grooming   Lower Body Dressing Assessment/Training   [place] Level (Lower Body Dressing) moderate assist (50% patient effort)   Grooming Assessment/Training   [place] Level (Grooming) minimum assist (75% patient effort)   Toileting   [place] Level (Toileting) minimum assist (75% patient effort)   Clinical Impression   Criteria for Skilled Therapeutic Interventions Met (OT) Yes, treatment indicated   OT Diagnosis Decreased ind with ADLs and safety   Influenced by the following impairments Tachypnea   OT Problem List-Impairments impacting ADL activity tolerance impaired;balance;cognition;mobility;strength;inability to direct their own care   Assessment of Occupational Performance 3-5 Performance Deficits   Identified Performance Deficits dressing, toileting, bathing, cognition, fxl transfers   Planned Therapy Interventions (OT) ADL retraining;balance training;bed mobility training;cognition;strengthening;progressive activity/exercise;transfer training;risk factor education   Clinical Decision Making Complexity (OT) detailed assessment/moderate complexity   Risk & Benefits of therapy have been explained evaluation/treatment results reviewed;care plan/treatment goals reviewed;risks/benefits reviewed;current/potential barriers reviewed;patient   OT Total Evaluation Time   OT Eval, Moderate Complexity Minutes (08394) 10   OT Goals   Therapy Frequency (OT) 5 times/week   OT Predicted Duration/Target Date for Goal Attainment 01/16/25   OT Goals Lower Body Dressing;Hygiene/Grooming;Toilet  Transfer/Toileting;Cognition   OT: Hygiene/Grooming modified independent;while standing   OT: Lower Body Dressing Modified independent   OT: Toilet Transfer/Toileting Modified independent;toilet transfer;cleaning and garment management   OT: Cognitive Patient/caregiver will verbalize understanding of cognitive assessment results/recommendations as needed for safe discharge planning   Self-Care/Home Management   Self-Care/Home Mgmt/ADL, Compensatory, Meal Prep Minutes (90746) 9   Symptoms Noted During/After Treatment (Meal Preparation/Planning Training) none   Treatment Detail/Skilled Intervention Eval completed, treatment initiated. Pt required extra time and redirection at EOB for attention to lines and task. Additional STS from EOB CGA, fxl mob min A w/ FWW to commode, min A for controlled descent, verbal and tactile cues for hand placement. Returned to EOB CGA, abandoning walker with transfer. Returned to supine SBA. Left with call light in reach alarm on.   OT Discharge Planning   OT Plan Cognition, progress transfers, walker safety, toileting, fxl ADLs   OT Discharge Recommendation (DC Rec) Transitional Care Facility   OT Rationale for DC Rec Pt currently Ax1 for all ADLs, mobility, safety - lives alone at baseline, recommend TCU to progress ind and safety, may benefit from more supportive living environment   OT Brief overview of current status Ax1 commode transfer   OT Total Distance Amb During Session (feet) 5   Total Session Time   Timed Code Treatment Minutes 9   Total Session Time (sum of timed and untimed services) 19

## 2025-01-09 NOTE — H&P
[facility]    History and Physical - Hospitalist Service       Date of Admission:  1/8/2025    Assessment & Plan      Patrick Green is a 84 year old male admitted on 1/8/2025. He has a history of Alzheimer's dementia, afib status post cardioversion not on anticoagulation, NSTEMI status post stents, coronary artery disease, BPH, CKD3 and is admitted for tachypnea and fall, also found to have new fever.    Tachypnea  Fever  Presented with tachypnea, increased disorientation on day of admission, now with new fever during admission concerning for infection. Unclear etiology of tachypnea. Chest CT on admission did not show PNA or pulmonary edema; BNP and procal and WBC normal. However, wheezing and bilateral crackles heard on exam, and RN reporting that patient was choking on sputum. Possible etiologies of symptoms could be aspiration pneumonitis, CHF exacerbation, UTI, COPD (per son, patient quit smoking more than 30 years ago), and PE. If this is aspiration pneumonitis, IDSA guidelines state that patient does NOT need to be on antibiotics as chest CT did not show focal consolidation. Will defer antibiotics for now and do further workup.  - Admit to inpatient  - Blood cultures, urinalysis, sputum culture - if these are positive, will start antibiotics  - AM CBC, BMP  - IV Lasix 20 mg once  - Echocardiogram ordered - last one in 3/2018 with normal EF 65%  - Cardiac telemetry  - Duonebs q4h PRN for wheezing  - SLP to eval for aspiration  - PRN Tylenol for fever    Fall  Per son, patient rarely falls. Given above symptoms, this could be causing balance issues/weakness leading to fall. Imaging on admission negative for acute changes/fractures.  - PT/OT/SW for dispo planning - patient lives with ji Babin but son Jose Antonio is main point of contact  - PRN Tylenol for back pain    Elevated troponin  Troponin 25 --> 27 on admission. EKG with sinus tachycardia and PVCs. Patient denies chest pain. Low  "suspicion for ACS; could be demand ischemia.  - 3rd troponin pending    CKD3  Cr on admission 1.51. Baseline Cr per Care Everywhere appears to be around 1.3.  - Trend Cr    R pulmonary nodule  3 mm RUL pulmonary nodule seen on admission imaging. Fleischner recs below. Informed son Jose Antonio over the phone.  SINGLE NODULE   Nodule size <6 mm   Low-risk patients: No follow-up needed.   High-risk patients: Optional follow-up at 12 months.     Chronic medical conditions: (pending med rec)  Alzheimer's: PTA Aricept, Namenda  BPH: PTA Flomax, Proscar  History of NSTEMI status post stents, coronary artery disease: PTA ASA, atorvastatin, Plavix  Anemia: Hgb at baseline on admission. Continue PTA iron.  Hypertension: PTA lisinopril  Afib: PTA metoprolol        Diet: Regular diet  DVT Prophylaxis: Enoxaparin (Lovenox) SQ  Ann Catheter: Not present  Fluids: PO  Lines: None     Cardiac Monitoring: ACTIVE order. Indication: Tachypnea  Code Status: No CPR- Do NOT Intubate    Clinically Significant Risk Factors Present on Admission                 # Drug Induced Platelet Defect: home medication list includes an antiplatelet medication   # Hypertension: Home medication list includes antihypertensive(s)     # Dementia: noted on problem list       # Obesity: Estimated body mass index is 33.97 kg/m  as calculated from the following:    Height as of this encounter: 1.753 m (5' 9\").    Weight as of this encounter: 104.3 kg (230 lb).              Disposition Plan      Expected Discharge Date: 01/11/2025                The patient's care was discussed with the Attending Physician, Dr. Sherman. Dr. Jha will see patient in AM .      Genie Ojeda MD  Hospitalist Service  [facility]  Securely message with [employer] (more info)  Text page via [facility] Paging/Directory   ______________________________________________________________________    Chief Complaint   Dyspnea, fall    History is obtained from the patient and patient's " son    History of Present Illness   Patrick Green is a 84 year old male who has a history of Alzheimer's dementia, afib status post cardioversion not on anticoagulation, NSTEMI status post stents, coronary artery disease, BPH, CKD3 and presents with fall and dyspnea.    Patient has dementia and was somnolent as well as a poor historian, so majority of history is obtained from calling patient's son, Jose Antonio. Patient has 3 sons - oldest Brandon, who is POA, middle son Jose Antonio (Brandon defers to Jose Antonio for medical decisions per Jose Antonio), and youngest son Olaf, whom patient lives with. Patient is . He has caregivers who come to his house to take care of him in shifts. Jose Antonio lives 3 blocks away and checks on his father daily. Jose Antonio states that Olaf has a copy of patient's living will and is DNR/DNI.    Today, when caregiver came to Olaf's house in the late morning, they noticed the patient had fallen on the floor. Unclear how long he had been on the floor for. Unclear if he had LOC. Per son, patient rarely falls - has only fallen twice in this past year. After caregiver got him back up, patient was complaining of back pain and caregiver noted that he seemed more unstable on his feet. Later, Jose Antonio called second caregiver there for the next shift, who noted that patient was sitting in a chair and did not want to move due to back pain and being out of breath. Jose Antonio then asked his wife to take him to clinic, who told them to go to the ED. Jose Antonio also notes that patient was sleeping in the recliner the night before, which is unusual for him. He typically breathes through his mouth with puffing his lips and shallow breaths, but Jose Antonio noticed his breathing was heavier today. He also seemed more disoriented.    In the ED, patient has tachypnea with RR in 30s, otherwise satting in low 90s on RA, /73, and other vitals WNL. Labs notable for Cr 1.51 (increased from baseline ~1.3), lactate normal, BNP normal, VBG unremarkable, CBC  unremarkable (Hgb at/improved from baseline), negative respiratory viral panel. Chest CT without PNA or pulmonary edema, cervical spine CT and head CT negative for acute changes. Received Seroquel 25 mg in ED for sundowning.    Upon my interview with patient, he reports the only pain he has is in his low back. Denies fever, dyspnea, and dysuria. RN recheck temperature and he is now febrile. Had increased tachypnea with sitting up. Also had an episode of productive cough.    Past Medical History    Past Medical History:   Diagnosis Date    BPH (benign prostatic hyperplasia)     CAD (coronary artery disease)     Dementia (H)     HLD (hyperlipidemia)        Past Surgical History   Past Surgical History:   Procedure Laterality Date    APPENDECTOMY      COLONOSCOPY N/A 11/27/2018    Procedure: COLONOSCOPY;  Surgeon: Kathryn Infante MD;  Location: [facility];  Service: Gastroenterology    CORONARY STENT PLACEMENT  2014    LAD and RCA       Prior to Admission Medications   Prior to Admission Medications   Prescriptions Last Dose Informant Patient Reported? Taking?   DOCOSAHEXANOIC ACID/EPA (FISH OIL ORAL)   Yes No   Sig: [DOCOSAHEXANOIC ACID/EPA (FISH OIL ORAL)] Take 1,500 mg by mouth daily.   acetaminophen (TYLENOL EXTRA STRENGTH) 500 MG tablet   Yes No   Sig: Take 500-1,000 mg by mouth every 6 hours as needed    aspirin (ASA) 81 MG EC tablet   No No   Sig: Take 1 tablet (81 mg) by mouth daily   atorvastatin (LIPITOR) 80 MG tablet   Yes No   Sig: Take 80 mg by mouth At Bedtime   clopidogrel (PLAVIX) 75 MG tablet   No No   Sig: Take 1 tablet (75 mg) by mouth At Bedtime   donepezil (ARICEPT) 10 MG tablet   Yes No   Sig: [DONEPEZIL (ARICEPT) 10 MG TABLET] Take 10 mg by mouth every morning.    ferrous sulfate (FEROSUL) 325 (65 Fe) MG tablet   No No   Sig: Take 1 tablet (325 mg) by mouth every other day Take in the evening, at least several hours after taking levofloxacin.   finasteride (PROSCAR) 5 mg tablet   Yes No   Sig:  [FINASTERIDE (PROSCAR) 5 MG TABLET] Take 5 mg by mouth daily.    lisinopril (ZESTRIL) 5 MG tablet   Yes No   Sig: Take 5 mg by mouth daily   memantine (NAMENDA) 10 MG tablet   Yes No   Sig: Take 10 mg by mouth 2 times daily    metoprolol succinate (TOPROL-XL) 25 MG   Yes No   Sig: [METOPROLOL SUCCINATE (TOPROL-XL) 25 MG] Take 25 mg by mouth daily.    nitroglycerin (NITROSTAT) 0.4 MG SL tablet   Yes No   Sig: [NITROGLYCERIN (NITROSTAT) 0.4 MG SL TABLET] Place 0.4 mg under the tongue.   silver sulfADIAZINE (SILVADENE) 1 % external cream   No No   Sig: Apply topically daily   tamsulosin (FLOMAX) 0.4 mg Cp24   Yes No   Sig: [TAMSULOSIN (FLOMAX) 0.4 MG CP24] Take 0.4 mg by mouth daily after supper.    vit C/E/Zn/coppr/lutein/zeaxan (PRESERVISION AREDS-2 ORAL)   Yes No   Sig: [VIT C/E/ZN/COPPR/LUTEIN/ZEAXAN (PRESERVISION AREDS-2 ORAL)] Take 1 tablet by mouth 2 (two) times a day.      Facility-Administered Medications: None        Review of Systems    Pertinent positives and negatives as noted in HPI.    Allergies   Allergies   Allergen Reactions    Diatrizoate Other (See Comments) and Rash     Anaphylactic reaction during 5/17/14 coronary angiogram per patient   Other reaction(s): Hypotension  Anaphylactic reaction during 5/17/14 coronary angiogram per patient   Anaphylactic reaction during 5/17/14 coronary angiogram per patient       Simvastatin Other (See Comments)     Muscle weakness  Other reaction(s): Muscle Weakness    Zocor [Simvastatin]         Physical Exam   Vital Signs: Temp: (!) 100.5  F (38.1  C) Temp src: Oral BP: (!) 164/75 Pulse: 89   Resp: (!) 40 SpO2: 93 % O2 Device: None (Room air)    Weight: 230 lbs 0 oz    General Appearance: sleepy, lying in bed, occasionally more agitated with coughing  HEENT: normocephalic, no nasal discharge  Respiratory: difficult exam with patient taking shallow breaths. Patient's wheezing is audible from bedside without auscultation. Also has mild crackles in bilateral lung  bases.  Cardiovascular: RRR, normal S1 and S2, no murmurs. No LE edema bilaterally.  GI: soft, +BS, nontender to palpation  Lymph/Hematologic: no cervical lymphadenopathy  Skin: no significant lesions, bruising or rashes on visible skin  Musculoskeletal: no gross musculoskeletal deformities  Neurologic: sleepy, but does wake to touch  Psychiatric: pleasant affect    Medical Decision Making       Please see A&P for additional details of medical decision making.      Data     I have personally reviewed the following data over the past 24 hrs:    7.3  \   12.0 (L)   / 138 (L)     136 102 26.4 (H) /  103 (H)   3.8 20 (L) 1.49 (H) \     Trop: 27 (H) BNP: 430     Procal: 0.21 CRP: 13.40 (H) Lactic Acid: 1.2         Imaging results reviewed over the past 24 hrs:   Recent Results (from the past 24 hours)   Head CT w/o contrast    Narrative    EXAM: CT HEAD W/O CONTRAST  LOCATION: [facility]  DATE: 1/8/2025    INDICATION: fall, thinners  COMPARISON: None.  TECHNIQUE: Routine CT Head without IV contrast. Multiplanar reformats. Dose reduction techniques were used.    FINDINGS:  INTRACRANIAL CONTENTS: No evidence of acute intracranial hemorrhage or mass effect. Scattered foci of decreased attenuation within the cerebral hemispheric white matter which are nonspecific, though most commonly ascribed to chronic small vessel ischemic   disease. The ventricles and sulci are prominent consistent with moderate brain parenchymal volume loss. Gray-white matter differentiation is maintained. The basilar cisterns are patent.    VISUALIZED ORBITS/SINUSES/MASTOIDS: The globes are unremarkable. The partially imaged paranasal sinuses and mastoid air cells are unremarkable.     BONES/SOFT TISSUES: The visualized skull base and calvarium are unremarkable.      Impression    IMPRESSION:    1.  No evidence of acute intracranial hemorrhage or mass effect.  2.  Moderate nonspecific white matter changes.  3.  Moderate brain  parenchymal volume loss.   CT Cervical Spine w/o Contrast    Narrative    EXAM: CT CERVICAL SPINE W/O CONTRAST  LOCATION: [facility]  DATE: 1/8/2025    INDICATION: fall  COMPARISON: None.  TECHNIQUE: Routine CT Cervical Spine without IV contrast. Multiplanar reformats. Dose reduction techniques were used.    FINDINGS:  No evidence of acute fracture or subluxation of the cervical spine by CT imaging. Straightening of the normal cervical lordosis. The vertebral bodies of the cervical spine otherwise have normal stature and alignment. Multilevel degenerative disc disease   of the cervical spine with with disc height loss, most pronounced at C4/C5 and C5/C6. No extraspinal abnormality.     Craniovertebral junction and C1-C2: The odontoid process is well approximated with the anterior body of C1 and well aligned between the lateral masses of C1.    C2-C3: No posterior disc bulge or spinal canal narrowing. Uncovertebral joint disease and facet arthropathy with severe bilateral neural foraminal narrowing.     C3-C4: No posterior disc bulge or spinal canal narrowing. No neural foraminal narrowing.     C4-C5: No posterior disc bulge or spinal canal narrowing. Uncovertebral joint disease and facet arthropathy with severe bilateral neural foraminal narrowing.     C5-C6: No posterior disc bulge or spinal canal narrowing. Uncovertebral joint disease and facet arthropathy with severe bilateral neural foraminal narrowing.     C6-C7: No posterior disc bulge or spinal canal narrowing. No neural foraminal narrowing.     C7-T1: No posterior disc bulge or spinal canal narrowing. No neural foraminal narrowing.       Impression    IMPRESSION:  1.  No evidence of acute fracture or subluxation of the cervical spine by CT imaging.  2.  Degenerative cervical spondylosis as described above.   Chest XR,  PA & LAT    Narrative    EXAM: XR CHEST 2 VIEWS  LOCATION: [facility]  DATE:  1/8/2025    INDICATION: weakness, dyspnea  COMPARISON: None.      Impression    IMPRESSION: Heart is normal in size. Minimal atelectasis at the left lung base. Lungs are otherwise clear.   Chest CT w/o contrast    Narrative    EXAM: CT CHEST W/O CONTRAST  LOCATION: [facility]  DATE: 1/8/2025    INDICATION: dysnea, crackles bilat worsen on L  COMPARISON: Chest radiographs from 1/8/2025  TECHNIQUE: CT chest without IV contrast. Multiplanar reformats were obtained. Dose reduction techniques were used.  CONTRAST: None.    FINDINGS:   LUNGS AND PLEURA: Strands of atelectasis or scarring in the lingula. No acute airspace consolidation. There is a 3 mm right apical nodule on image 65 of series 5. No pleural effusion.    MEDIASTINUM/AXILLAE: Normal heart size. Extensive multivessel coronary artery disease with stents in place.    CORONARY ARTERY CALCIFICATION: Previous intervention (stents or CABG).    UPPER ABDOMEN: Small gallstones. Multiple hepatic hypodensities, incompletely characterized via noncontrast technique.    MUSCULOSKELETAL: Osteopenia. No acute osseous findings.      Impression    IMPRESSION:   1.  No evidence of pneumonia or pulmonary edema. There is some atelectasis or scarring in the lingula.    2.  3 mm right upper lobe pulmonary nodule. Please see follow-up guidelines below.    3.  Severe coronary artery disease, status post stenting.    REFERENCE:  Guidelines for Management of Incidental Pulmonary Nodules Detected on CT Images: From the Fleischner Society 2017.   Guidelines apply to incidental nodules in patients who are 35 years or older.  Guidelines do not apply to lung cancer screening, patients with immunosuppression, or patients with known primary cancer.    SINGLE NODULE  Nodule size <6 mm  Low-risk patients: No follow-up needed.  High-risk patients: Optional follow-up at 12 months.

## 2025-01-09 NOTE — PROGRESS NOTES
01/09/25 1000   Appointment Info   Signing Clinician's Name / Credentials (PT) Nahomi Downing, PT, DPT   Living Environment   People in Home alone   Current Living Arrangements house   Home Accessibility no concerns   Living Environment Comments unsure of accuracy, pt poor historian   Self-Care   Usual Activity Tolerance moderate   Current Activity Tolerance fair   Equipment Currently Used at Home none   General Information   Onset of Illness/Injury or Date of Surgery 01/08/25   Referring Physician Shweta Jha MD   Patient/Family Therapy Goals Statement (PT) home   Pertinent History of Current Problem (include personal factors and/or comorbidities that impact the POC) tachypnea, weakness, fall, dyspnea   Existing Precautions/Restrictions no known precautions/restrictions   Weight-Bearing Status - LLE weight-bearing as tolerated   Weight-Bearing Status - RLE weight-bearing as tolerated   Range of Motion (ROM)   ROM Comment WFL   Strength (Manual Muscle Testing)   Strength Comments grossly WFL   Bed Mobility   Bed Mobility supine-sit   Supine-Sit Bronson (Bed Mobility) minimum assist (75% patient effort);verbal cues   Transfers   Transfers sit-stand transfer   Sit-Stand Transfer   Sit-Stand Bronson (Transfers) contact guard;verbal cues   Assistive Device (Sit-Stand Transfers) walker, front-wheeled   Gait/Stairs (Locomotion)   Bronson Level (Gait) contact guard;verbal cues   Assistive Device (Gait) walker, front-wheeled   Distance in Feet (Gait) 10'   Pattern (Gait) step-to;step-through   Clinical Impression   Criteria for Skilled Therapeutic Intervention Yes, treatment indicated   PT Diagnosis (PT) impaired functional mobility   Influenced by the following impairments weakness, pain, impaired balance   Functional limitations due to impairments gait, stairs, transfers   Clinical Presentation (PT Evaluation Complexity) stable   Clinical Presentation Rationale pt presents as medically diagnosed    Clinical Decision Making (Complexity) moderate complexity   Planned Therapy Interventions (PT) balance training;bed mobility training;gait training;home exercise program;patient/family education;ROM (range of motion);stair training;strengthening;transfer training   Risk & Benefits of therapy have been explained care plan/treatment goals reviewed;patient   PT Total Evaluation Time   PT Eval, Moderate Complexity Minutes (86182) 10   Physical Therapy Goals   PT Frequency 5x/week   PT Predicted Duration/Target Date for Goal Attainment 01/16/25   PT Goals Transfers;Gait   PT: Transfers Supervision/stand-by assist;Sit to/from stand   PT: Gait Supervision/stand-by assist;Rolling walker;150 feet   Interventions   Interventions Quick Adds Gait Training;Therapeutic Activity   Therapeutic Activity   Therapeutic Activities: dynamic activities to improve functional performance Minutes (46709) 10   Treatment Detail/Skilled Intervention supine to sit, increased effort, HOB elevated, min A for trunk, sitting EOB, SBA, cueing for posture and safety, sit to/from stand, CGA, cueing for safe hand placement, standing balance x2 min with FWW, steady, CGA   Gait Training   Gait Training Minutes (86800) 12   Symptoms Noted During/After Treatment (Gait Training) fatigue   Treatment Detail/Skilled Intervention cueing for posture, patterning and safety, required assist to navigate FWW around obstacles   Distance in Feet 100   [place]rio Level (Gait Training) minimum assist (75% patient effort)   Physical Assistance Level (Gait Training) supervision;verbal cues;2 person assist   Weight Bearing (Gait Training) weight-bearing as tolerated   Assistive Device (Gait Training) rolling walker   Pattern Analysis (Gait Training) swing-through gait   Gait Analysis Deviations decreased phill;decreased step length;decreased stride length   Impairments (Gait Analysis/Training) balance impaired;strength decreased   PT Discharge Planning   PT Plan gait  (decrease AD as able), LE therex, balance   PT Discharge Recommendation (DC Rec) (S)  Transitional Care Facility   PT Rationale for DC Rec TCU or 24 hour supervision/ assist and use of FWW, pt unsteady and requiring assist for safety, pt will benefit from further rehabilitation   PT Brief overview of current status requiring A x1-2 and FWW for safety with ambulation   PT Total Distance Amb During Session (feet) 100   PT Equipment Needed at Discharge walker, rolling   Physical Therapy Time and Intention   Timed Code Treatment Minutes 22   Total Session Time (sum of timed and untimed services) 32

## 2025-01-09 NOTE — ED PROVIDER NOTES
NAME: Patrick Green  AGE: 84 year old male  YOB: 1940  MRN: [id number]  EVALUATION DATE & TIME: 2025  8:53 PM    PCP: Bains, Pallavi    ED PROVIDER: Alf Holley M.D.      Chief Complaint   Patient presents with    Fall    Generalized Weakness     FINAL IMPRESSION:  1. Fall at home, initial encounter    2. Tachypnea    3. Dyspnea, unspecified type      MEDICAL DECISION MAKIN:08 PM I met with the patient, obtained history, performed an initial exam, and discussed options and plan for diagnostics and treatment here in the ED.   10:46 PM I rechecked and updated patient. Both son and patient agrees to staying tonight. Son also clarified that patient did have an allergic reaction to CT contrast during a procedure back in . They were told to not use CT contrast in the future.    12:25 AM   Pt was sleeping but still tachypneic. Still tachypneic when woken up.  Patient did not report anything.   Patient was clinically assessed and consented to treatment. After assessment, medical decision making and workup were discussed with the patient. The patient was agreeable to plan for testing, workup, and treatment.  Pertinent Labs & Imaging studies reviewed. (See chart for details)       Medical Decision Making  Obtained supplemental history:Supplemental history obtained?: Documented in chart  Reviewed external records: External records reviewed?: Documented in chart  Care impacted by chronic illness:Dementia, Heart Disease, and Hyperlipidemia  Care significantly affected by social determinants of health:N/A  Did you consider but not order tests?: In addition to work-up documented, I considered the following work up:   Did you interpret images independently?: Independent interpretation of ECG and images noted in documentation, when applicable.  Consultation discussion with other provider:Did you involve another provider (consultant, , pharmacy, etc.)?: I discussed the care with another health  care provider, see documentation for details.  Admit.  Adult Minor Head Trauma:GCS less than 15 and Age 65 years or older    Patrick Green is a 84 year old male who presents with fall and generalized weakness, increased work of breathing.   Differential diagnosis includes but not limited to fall, intracranial hemorrhage, cervical spine fracture, COVID-19, influenza, CHF exacerbation, COPD exacerbation, acute coronary syndrome, pulmonary embolism.  Patient is a 4-year-old male with history of dementia and history is limited due to this.  History mainly is provided by his son who was here and had obtained some of the history from the caretakers at home who come in intermittently to help with patient antibiotics and blood cultures due to his dementia.  He does have some marks on his forehead around the bridge of his nose from his glasses which could be from the trauma.  Patient was found on the floor but getting up when the caretaker did arrive.  CT scan of the head and neck were done no patient not on blood thinners with his dementia history is unclear.  CT scans of head and neck were negative however patient also noted during initial exam that he had some increased work of breathing.  Patient was also tachypneic though he did not complain of shortness of breath or chest pain or other symptoms my concern would be for breathing and x-ray, labs were ordered.  Patient with sinus tachycardia with frequent PVCs but no signs of acute ST elevation.  Labs showed a critical troponin we will plan to repeat this.  BNP was normal although he did have crackles bilaterally and I am concerned about CHF exacerbation as he also has some pretibial edema.  Chest x-ray reassuring with no edema or infiltrate seen.  Patient still with the tachypnea though he does not complain about shortness of breath.  After negative trauma workup and labs showing some slight increase in creatinine and BUN which could be dehydrational and son does report  patient does not drink much water.  However also this could cause backup of fluids and possibly some edema.  I did consider CTA for pulmonary embolism however patient has significant allergy to contrast after angiography in the past and we will proceed with CT of the chest without contrast.  This did not show any findings of pneumonia or significant edema in the with the crackles a inconsistent concerned about patient's breathing and vital signs are for stable with normal oxygenation but with his increased work of breathing I did speak with family about watching overnight and getting an echocardiogram in the morning.  Patient comfortable with this plan and son agreeable.  Patient discussed with resident service for admission and will consider dose of Lasix however they will see patient first in order if needed.    0 minutes of critical care time    MEDICATIONS GIVEN IN THE EMERGENCY:  Medications   senna-docusate (SENOKOT-S/PERICOLACE) 8.6-50 MG per tablet 1 tablet (has no administration in time range)     Or   senna-docusate (SENOKOT-S/PERICOLACE) 8.6-50 MG per tablet 2 tablet (has no administration in time range)   ondansetron (ZOFRAN ODT) ODT tab 4 mg (has no administration in time range)     Or   ondansetron (ZOFRAN) injection 4 mg (has no administration in time range)   prochlorperazine (COMPAZINE) injection 5 mg (has no administration in time range)     Or   prochlorperazine (COMPAZINE) tablet 5 mg (has no administration in time range)   lidocaine 1 % 0.1-1 mL (has no administration in time range)   lidocaine (LMX4) cream (has no administration in time range)   sodium chloride (PF) 0.9% PF flush 3 mL (has no administration in time range)   sodium chloride (PF) 0.9% PF flush 3 mL (3 mLs Intracatheter $Given 1/9/25 8729)   enoxaparin ANTICOAGULANT (LOVENOX) injection 40 mg (40 mg Subcutaneous $Given 1/9/25 5024)   ipratropium - albuterol 0.5 mg/2.5 mg/3 mL (DUONEB) neb solution 3 mL (3 mLs Nebulization $Given  1/9/25 0310)   acetaminophen (TYLENOL) tablet 650 mg (650 mg Oral $Given 1/9/25 0337)     Or   acetaminophen (TYLENOL) Suppository 650 mg ( Rectal See Alternative 1/9/25 0337)   QUEtiapine (SEROquel) tablet 25 mg (25 mg Oral $Given 1/8/25 2313)   furosemide (LASIX) injection 20 mg (20 mg Intravenous $Given 1/9/25 0226)       NEW PRESCRIPTIONS STARTED AT TODAY'S ER VISIT:  New Prescriptions    No medications on file          =================================================================    HPI    Patient information was obtained from: patient's son    Use of : N/A         Patrick MARTINEZ Norma is a 84 year old male with a past medical history of Dementia, CAD, who presents fall.    HPI limited due to dementia.    Per son, patient lives at home with his brother and has a caregiver that comes throughout the day. Son states that brother gets home from work at around 10 AM and caregiver came around 12 PM. Around 12 PM today, caregiver saw that patient was getting up from the ground. They suspect the fall was between 11 AM-12 PM today but are unsure. Later on throughout the day, caregiver's boss contacted son that they believe he had an unwitnessed fall today and was advised to bring patient into the ED. Son is unsure if he had any head trauma or LOC. Brother notes that patient did sleep upright in a recliner last night which he usually doesn't do (he typically sleeps in a recliner during naps). He notes that the room which patient slept in didn't have anything to hit his head against. Patient denies any pain anywhere but he does have dementia. Son's wife who is a nurse also notes that patient had about 36 respirations/minute and feels like he had a harder time breathing. Son denies any recent sick contacts or cough or cold symptoms. There were no other concerns/complaints at this time.      REVIEW OF SYSTEMS   Review of Systems   Unable to perform ROS: Dementia       PAST MEDICAL HISTORY:  Past Medical History:    Diagnosis Date    BPH (benign prostatic hyperplasia)     CAD (coronary artery disease)     Dementia (H)     HLD (hyperlipidemia)        PAST SURGICAL HISTORY:  Past Surgical History:   Procedure Laterality Date    APPENDECTOMY      COLONOSCOPY N/A 11/27/2018    Procedure: COLONOSCOPY;  Surgeon: Kathryn Infante MD;  Location: [facility];  Service: Gastroenterology    CORONARY STENT PLACEMENT  2014    LAD and RCA       CURRENT MEDICATIONS:      Current Facility-Administered Medications:     acetaminophen (TYLENOL) tablet 650 mg, 650 mg, Oral, Q4H PRN, 650 mg at 01/09/25 0337 **OR** acetaminophen (TYLENOL) Suppository 650 mg, 650 mg, Rectal, Q4H PRN, Genie Ojeda MD    enoxaparin ANTICOAGULANT (LOVENOX) injection 40 mg, 40 mg, Subcutaneous, Q24H, Genie Ojeda MD, 40 mg at 01/09/25 0337    ipratropium - albuterol 0.5 mg/2.5 mg/3 mL (DUONEB) neb solution 3 mL, 3 mL, Nebulization, Q4H PRN, Genie Ojeda MD, 3 mL at 01/09/25 0310    lidocaine (LMX4) cream, , Topical, Q1H PRN, Genie Ojeda MD    lidocaine 1 % 0.1-1 mL, 0.1-1 mL, Other, Q1H PRN, Genie Ojeda MD    ondansetron (ZOFRAN ODT) ODT tab 4 mg, 4 mg, Oral, Q6H PRN **OR** ondansetron (ZOFRAN) injection 4 mg, 4 mg, Intravenous, Q6H PRN, Genie Ojeda MD    prochlorperazine (COMPAZINE) injection 5 mg, 5 mg, Intravenous, Q6H PRN **OR** prochlorperazine (COMPAZINE) tablet 5 mg, 5 mg, Oral, Q6H PRN, Genie Ojeda MD    senna-docusate (SENOKOT-S/PERICOLACE) 8.6-50 MG per tablet 1 tablet, 1 tablet, Oral, BID PRN **OR** senna-docusate (SENOKOT-S/PERICOLACE) 8.6-50 MG per tablet 2 tablet, 2 tablet, Oral, BID PRN, Genie Ojeda MD    sodium chloride (PF) 0.9% PF flush 3 mL, 3 mL, Intracatheter, q1 min prn, Genie Ojeda MD    sodium chloride (PF) 0.9% PF flush 3 mL, 3 mL, Intracatheter, Q8H, Genie Ojeda MD, 3 mL at 01/09/25 0149    Current Outpatient Medications:     acetaminophen (TYLENOL EXTRA STRENGTH) 500 MG tablet, Take 500-1,000 mg by mouth every 6 hours as needed , Disp: , Rfl:     aspirin  (ASA) 81 MG EC tablet, Take 1 tablet (81 mg) by mouth daily, Disp: , Rfl:     atorvastatin (LIPITOR) 80 MG tablet, Take 80 mg by mouth At Bedtime, Disp: , Rfl:     clopidogrel (PLAVIX) 75 MG tablet, Take 1 tablet (75 mg) by mouth At Bedtime, Disp: , Rfl:     DOCOSAHEXANOIC ACID/EPA (FISH OIL ORAL), [DOCOSAHEXANOIC ACID/EPA (FISH OIL ORAL)] Take 1,500 mg by mouth daily., Disp: , Rfl:     donepezil (ARICEPT) 10 MG tablet, [DONEPEZIL (ARICEPT) 10 MG TABLET] Take 10 mg by mouth every morning. , Disp: , Rfl:     ferrous sulfate (FEROSUL) 325 (65 Fe) MG tablet, Take 1 tablet (325 mg) by mouth every other day Take in the evening, at least several hours after taking levofloxacin., Disp: 30 tablet, Rfl: 0    finasteride (PROSCAR) 5 mg tablet, [FINASTERIDE (PROSCAR) 5 MG TABLET] Take 5 mg by mouth daily. , Disp: , Rfl:     lisinopril (ZESTRIL) 5 MG tablet, Take 5 mg by mouth daily, Disp: , Rfl:     memantine (NAMENDA) 10 MG tablet, Take 10 mg by mouth 2 times daily , Disp: , Rfl:     metoprolol succinate (TOPROL-XL) 25 MG, [METOPROLOL SUCCINATE (TOPROL-XL) 25 MG] Take 25 mg by mouth daily. , Disp: , Rfl:     nitroglycerin (NITROSTAT) 0.4 MG SL tablet, [NITROGLYCERIN (NITROSTAT) 0.4 MG SL TABLET] Place 0.4 mg under the tongue., Disp: , Rfl:     silver sulfADIAZINE (SILVADENE) 1 % external cream, Apply topically daily, Disp: 50 g, Rfl: 0    tamsulosin (FLOMAX) 0.4 mg Cp24, [TAMSULOSIN (FLOMAX) 0.4 MG CP24] Take 0.4 mg by mouth daily after supper. , Disp: , Rfl:     vit C/E/Zn/coppr/lutein/zeaxan (PRESERVISION AREDS-2 ORAL), [VIT C/E/ZN/COPPR/LUTEIN/ZEAXAN (PRESERVISION AREDS-2 ORAL)] Take 1 tablet by mouth 2 (two) times a day., Disp: , Rfl:     ALLERGIES:  Allergies   Allergen Reactions    Diatrizoate Other (See Comments) and Rash     Anaphylactic reaction during 5/17/14 coronary angiogram per patient   Other reaction(s): Hypotension  Anaphylactic reaction during 5/17/14 coronary angiogram per patient   Anaphylactic reaction  during 14 coronary angiogram per patient       Simvastatin Other (See Comments)     Muscle weakness  Other reaction(s): Muscle Weakness    Zocor [Simvastatin]        FAMILY HISTORY:  Family History   Problem Relation Age of Onset    Coronary Artery Disease Father        SOCIAL HISTORY:   Social History     Socioeconomic History    Marital status:      Spouse name: None    Number of children: None    Years of education: None    Highest education level: None   Tobacco Use    Smoking status: Former     Current packs/day: 0.00     Types: Cigarettes     Quit date: 2013     Years since quittin.0    Smokeless tobacco: Never   Substance and Sexual Activity    Alcohol use: Yes     Alcohol/week: 1.0 standard drink of alcohol    Drug use: No     Comment: Drug use: unable to assess   Social History Narrative    He is from [place], [place], [place] in [place] [facility].     Social Drivers of Health     Financial Resource Strain: Low Risk  (2024)    Received from [facility]    Financial Resource Strain     Difficulty of Paying Living Expenses: 3   Food Insecurity: No Food Insecurity (2024)    Received from [facility]    Food Insecurity     Do you worry your food will run out before you are able to buy more?: 1   Transportation Needs: No Transportation Needs (2024)    Received from [facility]    Transportation Needs     Does lack of transportation keep you from medical appointments?: 1     Does lack of transportation keep you from work, meetings or getting things that you need?: 1   Social Connections: Socially Integrated (2024)    Received from [facility]    Social Connections     Do you often feel lonely or isolated from those around you?: 0   Housing Stability: Low Risk  (2024)    Received from [facility]  "   Housing Stability     What is your housing situation today?: 1       PHYSICAL EXAM:    Vitals: BP (!) 141/69 (BP Location: Left arm, Patient Position: [place]Hills's)   Pulse 79   Temp 99.2  F (37.3  C) (Oral)   Resp (!) 31   Ht 1.753 m (5' 9\")   Wt 104.3 kg (230 lb)   SpO2 91%   BMI 33.97 kg/m     Physical Exam  Vitals and nursing note reviewed.   Constitutional:       General: He is not in acute distress.     Appearance: Normal appearance. He is normal weight. He is not ill-appearing or toxic-appearing.   HENT:      Head: Normocephalic.      Comments: Dragan from glasses on the bridge of the nose and forehead     Mouth/Throat:      Pharynx: Oropharynx is clear.   Eyes:      Extraocular Movements: Extraocular movements intact.      Pupils: Pupils are equal, round, and reactive to light.   Cardiovascular:      Rate and Rhythm: Regular rhythm. Tachycardia present.      Heart sounds: Normal heart sounds.   Pulmonary:      Effort: Tachypnea and accessory muscle usage present. No respiratory distress.      Breath sounds: No stridor. Examination of the right-lower field reveals decreased breath sounds and rales. Examination of the left-lower field reveals decreased breath sounds and rales. Decreased breath sounds and rales present. No wheezing or rhonchi.   Musculoskeletal:         General: Signs of injury (Older appearing abrasion to left elbow) present.      Cervical back: Normal range of motion and neck supple. No tenderness.      Right lower leg: Edema (Bilateral trace pretibial edema) present.      Left lower leg: Edema present.   Skin:     General: Skin is warm and dry.      Capillary Refill: Capillary refill takes less than 2 seconds.      Findings: No bruising.   Neurological:      General: No focal deficit present.      Mental Status: He is alert. He is disoriented.      Cranial Nerves: No cranial nerve deficit.      Motor: No weakness.      Coordination: Coordination normal.   Psychiatric:         Mood " and Affect: Mood normal.           LAB:  All pertinent labs reviewed and interpreted.  Labs Ordered and Resulted from Time of ED Arrival to Time of ED Departure   BASIC METABOLIC PANEL - Abnormal       Result Value    Sodium 136      Potassium 4.7      Chloride 103      Carbon Dioxide (CO2) 19 (*)     Anion Gap 14      Urea Nitrogen 26.4 (*)     Creatinine 1.51 (*)     GFR Estimate 45 (*)     Calcium 9.0      Glucose 118 (*)    TROPONIN T, HIGH SENSITIVITY - Abnormal    Troponin T, High Sensitivity 25 (*)    BLOOD GAS VENOUS - Abnormal    pH Venous 7.36      pCO2 Venous 42      pO2 Venous 34      Bicarbonate Venous 24      Base Excess/Deficit Venous -1.7      FIO2 21      Oxyhemoglobin Venous 57 (*)     O2 Sat, Venous 58.1 (*)    CBC WITH PLATELETS AND DIFFERENTIAL - Abnormal    WBC Count 8.2      RBC Count 4.50      Hemoglobin 13.1 (*)     Hematocrit 39.7 (*)     MCV 88      MCH 29.1      MCHC 33.0      RDW 13.5      Platelet Count 156      % Neutrophils 76      % Lymphocytes 12      % Monocytes 9      % Eosinophils 2      % Basophils 0      % Immature Granulocytes 1      NRBCs per 100 WBC 0      Absolute Neutrophils 6.2      Absolute Lymphocytes 1.0      Absolute Monocytes 0.7      Absolute Eosinophils 0.2      Absolute Basophils 0.0      Absolute Immature Granulocytes 0.1      Absolute NRBCs 0.0     TROPONIN T, HIGH SENSITIVITY - Abnormal    Troponin T, High Sensitivity 27 (*)    BASIC METABOLIC PANEL - Abnormal    Sodium 136      Potassium 3.8      Chloride 102      Carbon Dioxide (CO2) 20 (*)     Anion Gap 14      Urea Nitrogen 26.4 (*)     Creatinine 1.49 (*)     GFR Estimate 45 (*)     Calcium 8.4 (*)     Glucose 103 (*)    ROUTINE UA WITH MICROSCOPIC REFLEX TO CULTURE - Abnormal    Color Urine Yellow      Appearance Urine Clear      Glucose Urine Negative      Bilirubin Urine Negative      Ketones Urine Negative      Specific Gravity Urine 1.025      Blood Urine 0.03 mg/dL (*)     pH Urine 5.5      Protein  Albumin Urine 30 (*)     Urobilinogen Urine <2.0      Nitrite Urine Negative      Leukocyte Esterase Urine Negative      Mucus Urine Present (*)     RBC Urine 2      WBC Urine <1     CBC WITH PLATELETS - Abnormal    WBC Count 7.3      RBC Count 4.09 (*)     Hemoglobin 12.0 (*)     Hematocrit 35.7 (*)     MCV 87      MCH 29.3      MCHC 33.6      RDW 13.6      Platelet Count 138 (*)    CRP INFLAMMATION - Abnormal    CRP Inflammation 13.40 (*)    INFLUENZA A/B, RSV AND SARS-COV2 PCR - Normal    Influenza A PCR Negative      Influenza B PCR Negative      RSV PCR Negative      SARS CoV2 PCR Negative     MAGNESIUM - Normal    Magnesium 2.2     NT PROBNP INPATIENT - Normal    N terminal Pro BNP Inpatient 430     LACTIC ACID WHOLE BLOOD WITH 1X REPEAT IN 2 HR WHEN >2 - Normal    Lactic Acid, Initial 1.2     PROCALCITONIN - Normal    Procalcitonin 0.21     BASIC METABOLIC PANEL   CBC WITH PLATELETS   TROPONIN T, HIGH SENSITIVITY   BLOOD CULTURE   BLOOD CULTURE   RESPIRATORY AEROBIC BACTERIAL CULTURE       RADIOLOGY:  Chest CT w/o contrast   Final Result   IMPRESSION:    1.  No evidence of pneumonia or pulmonary edema. There is some atelectasis or scarring in the lingula.      2.  3 mm right upper lobe pulmonary nodule. Please see follow-up guidelines below.      3.  Severe coronary artery disease, status post stenting.      REFERENCE:   Guidelines for Management of Incidental Pulmonary Nodules Detected on CT Images: From the Fleischner Society 2017.    Guidelines apply to incidental nodules in patients who are 35 years or older.   Guidelines do not apply to lung cancer screening, patients with immunosuppression, or patients with known primary cancer.      SINGLE NODULE   Nodule size <6 mm   Low-risk patients: No follow-up needed.   High-risk patients: Optional follow-up at 12 months.         Chest XR,  PA & LAT   Final Result   IMPRESSION: Heart is normal in size. Minimal atelectasis at the left lung base. Lungs are  otherwise clear.      CT Cervical Spine w/o Contrast   Final Result   IMPRESSION:   1.  No evidence of acute fracture or subluxation of the cervical spine by CT imaging.   2.  Degenerative cervical spondylosis as described above.      Head CT w/o contrast   Final Result   IMPRESSION:     1.  No evidence of acute intracranial hemorrhage or mass effect.   2.  Moderate nonspecific white matter changes.   3.  Moderate brain parenchymal volume loss.      Echocardiogram Complete    (Results Pending)     EKG:   Performed at: 6:45 PM on January 8, 2025  Impression: Sinus tachycardia with frequent PVCs, no signs of acute ST elevation ischemia, no atrial fibrillation  Rate: 102 bpm  Rhythm: Sinus tachycardia with frequent PVCs  QRS Interval: 88 ms  QTc Interval: 427 ms  Comparison: Comparison to prior EKG from May 27, 2019 with increased ventricular rate in the PVCs  I have independently reviewed and interpreted the EKG(s) documented above.     PROCEDURES:   Procedures       I, Arlen Segal, am serving as a scribe to document services personally performed by Dr. Alf Holley  based on my observation and the provider's statements to me. I, Alf Holley MD attest that Arlen Segal is acting in a scribe capacity, has observed my performance of the services and has documented them in accordance with my direction.      Alf Holley M.D.  Emergency Medicine  [facility] Emergency Department       Alf Holley MD  01/09/25 0414

## 2025-01-09 NOTE — ED NOTES
Pt has baseline dementia. Given Seroquel for sundowning. Bed alarm placed under pt. Report given to TERESSA Ladd who will resume cares.

## 2025-01-09 NOTE — PROGRESS NOTES
Resident/Fellow Attestation   I, Cezar Gilbert MD, was present with the medical/ELISHA student who participated in the service and in the documentation of the note.  I have verified the history and personally performed the physical exam and medical decision making.  I agree with the assessment and plan of care as documented in the note.          Cezar Gilbert MD  PGY1  Date of Service (when I saw the patient): 01/09/25    [facility]    Progress Note - Hospitalist Service       Date of Admission:  1/8/2025    Assessment & Plan   Patrick Green is a 84 year old male admitted on 1/8/2025. He has a history of Alzheimer's dementia, afib status post cardioversion not on anticoagulation, NSTEMI status post stents, coronary artery disease, BPH, CKD3 and is admitted for tachypnea and fall, also found to have new fever.     Tachypnea  Fever  Presented with tachypnea, increased disorientation on day of admission, now with new fever during admission concerning for infection. Differential diagnosis includes aspiration pneumonitis, viral respiratory infection, CHF exacerbation, UTI, and COPD (per son, patient quit smoking more than 30 years ago). Chest CT on admission did not show PNA or pulmonary edema; BNP, procal, and WBC were normal. SLP performed a swallow evaluation today which was normal, making aspiration pneumonitis less likely. CHF exacerbation less likely given normal BNP and echo. Urinalysis was without signs of UTI. Blood and sputum cultures were obtained but are not resulted at this time. Currently there is no indication for antibiotics.   - Blood and sputum cultures pending   - AM CBC, BMP  - Cardiac telemetry  - Duonebs q4h PRN for wheezing  - PRN Tylenol for fever     Fall  Per son, patient rarely falls. Given above symptoms, this could be causing balance issues/weakness leading to fall. Imaging on admission negative for acute changes/fractures.  - PT/OT/SW for dispo planning -  "patient lives with ji Babin but ji Brady is main point of contact  - PRN Tylenol for back pain     Elevated troponin  Troponin 25 --> 27 --> 31 on admission. EKG with sinus tachycardia and PVCs. Patient denies chest pain. Low suspicion for ACS; could be demand ischemia.     CKD3  Cr on admission 1.51. Baseline Cr per Care Everywhere appears to be around 1.3.  - Trend Cr     R pulmonary nodule  3 mm RUL pulmonary nodule seen on admission imaging. Fleischner recs below. Informed ji Brady over the phone.  SINGLE NODULE   Nodule size <6 mm   Low-risk patients: No follow-up needed.   High-risk patients: Optional follow-up at 12 months.          Diet: Regular Diet Adult    DVT Prophylaxis: Enoxaparin (Lovenox) SQ  Ann Catheter: Not present  Fluids: None  Lines: None     Cardiac Monitoring: ACTIVE order. Indication: Tachypnea  Code Status: No CPR- Do NOT Intubate      Clinically Significant Risk Factors Present on Admission                 # Drug Induced Platelet Defect: home medication list includes an antiplatelet medication   # Hypertension: Home medication list includes antihypertensive(s)     # Dementia: noted on problem list       # Obesity: Estimated body mass index is 33.97 kg/m  as calculated from the following:    Height as of this encounter: 1.753 m (5' 9\").    Weight as of this encounter: 104.3 kg (230 lb).              Social Drivers of Health   Tobacco Use: Medium Risk (1/8/2025)    Patient History     Smoking Tobacco Use: Former     Smokeless Tobacco Use: Never         Disposition Plan     Medically Ready for Discharge: Anticipated Tomorrow         The patient's care was discussed with the Attending Physician, Dr. Jha .    Jeanne Garrett  Medical Student  Hospitalist Service  [facility]  Securely message with [employer] (more info)  Text page via [facility] Paging/Directory   ______________________________________________________________________    Interval History   Elevated " temperature overnight and elevated respiratory rate. Was given PRN tylenol. Patient denied having shortness of breath, cough, chills, and fatigue. Reported that he was feeling well.     Physical Exam   Vital Signs: Temp: 99.2  F (37.3  C) Temp src: Oral BP: (!) 141/69 Pulse: 79   Resp: (!) 31 SpO2: 91 % O2 Device: None (Room air)    Weight: 230 lbs 0 oz    General Appearance: alert, no acute distress, resting in bed   HEENT: normocephalic, no nasal discharge  Respiratory: Wheezing appreciated on auscultation bilaterally across all fields   Cardiovascular: RRR, normal S1 and S2, no murmurs. No LE edema bilaterally.  GI: soft, +BS, nontender to palpation  Lymph/Hematologic: no cervical lymphadenopathy  Skin: no significant lesions, bruising or rashes on visible skin  Musculoskeletal: no gross musculoskeletal deformities  Neurologic: alert but not oriented to time and place   Psychiatric: pleasant affect    Medical Decision Making       Please see A&P for additional details of medical decision making.      Data     I have personally reviewed the following data over the past 24 hrs:    7.3  \   12.0 (L)   / 138 (L)     137 102 26.7 (H) /  103 (H)   3.4 21 (L) 1.60 (H) \     Trop: 31 (H) BNP: 430     Procal: 0.21 CRP: 13.40 (H) Lactic Acid: 1.2         Imaging results reviewed over the past 24 hrs:   Recent Results (from the past 24 hours)   Head CT w/o contrast    Narrative    EXAM: CT HEAD W/O CONTRAST  LOCATION: [facility]  DATE: 1/8/2025    INDICATION: fall, thinners  COMPARISON: None.  TECHNIQUE: Routine CT Head without IV contrast. Multiplanar reformats. Dose reduction techniques were used.    FINDINGS:  INTRACRANIAL CONTENTS: No evidence of acute intracranial hemorrhage or mass effect. Scattered foci of decreased attenuation within the cerebral hemispheric white matter which are nonspecific, though most commonly ascribed to chronic small vessel ischemic   disease. The ventricles and sulci  are prominent consistent with moderate brain parenchymal volume loss. Gray-white matter differentiation is maintained. The basilar cisterns are patent.    VISUALIZED ORBITS/SINUSES/MASTOIDS: The globes are unremarkable. The partially imaged paranasal sinuses and mastoid air cells are unremarkable.     BONES/SOFT TISSUES: The visualized skull base and calvarium are unremarkable.      Impression    IMPRESSION:    1.  No evidence of acute intracranial hemorrhage or mass effect.  2.  Moderate nonspecific white matter changes.  3.  Moderate brain parenchymal volume loss.   CT Cervical Spine w/o Contrast    Narrative    EXAM: CT CERVICAL SPINE W/O CONTRAST  LOCATION: [facility]  DATE: 1/8/2025    INDICATION: fall  COMPARISON: None.  TECHNIQUE: Routine CT Cervical Spine without IV contrast. Multiplanar reformats. Dose reduction techniques were used.    FINDINGS:  No evidence of acute fracture or subluxation of the cervical spine by CT imaging. Straightening of the normal cervical lordosis. The vertebral bodies of the cervical spine otherwise have normal stature and alignment. Multilevel degenerative disc disease   of the cervical spine with with disc height loss, most pronounced at C4/C5 and C5/C6. No extraspinal abnormality.     Craniovertebral junction and C1-C2: The odontoid process is well approximated with the anterior body of C1 and well aligned between the lateral masses of C1.    C2-C3: No posterior disc bulge or spinal canal narrowing. Uncovertebral joint disease and facet arthropathy with severe bilateral neural foraminal narrowing.     C3-C4: No posterior disc bulge or spinal canal narrowing. No neural foraminal narrowing.     C4-C5: No posterior disc bulge or spinal canal narrowing. Uncovertebral joint disease and facet arthropathy with severe bilateral neural foraminal narrowing.     C5-C6: No posterior disc bulge or spinal canal narrowing. Uncovertebral joint disease and facet  arthropathy with severe bilateral neural foraminal narrowing.     C6-C7: No posterior disc bulge or spinal canal narrowing. No neural foraminal narrowing.     C7-T1: No posterior disc bulge or spinal canal narrowing. No neural foraminal narrowing.       Impression    IMPRESSION:  1.  No evidence of acute fracture or subluxation of the cervical spine by CT imaging.  2.  Degenerative cervical spondylosis as described above.   Chest XR,  PA & LAT    Narrative    EXAM: XR CHEST 2 VIEWS  LOCATION: [facility]  DATE: 1/8/2025    INDICATION: weakness, dyspnea  COMPARISON: None.      Impression    IMPRESSION: Heart is normal in size. Minimal atelectasis at the left lung base. Lungs are otherwise clear.   Chest CT w/o contrast    Narrative    EXAM: CT CHEST W/O CONTRAST  LOCATION: [facility]  DATE: 1/8/2025    INDICATION: dysnea, crackles bilat worsen on L  COMPARISON: Chest radiographs from 1/8/2025  TECHNIQUE: CT chest without IV contrast. Multiplanar reformats were obtained. Dose reduction techniques were used.  CONTRAST: None.    FINDINGS:   LUNGS AND PLEURA: Strands of atelectasis or scarring in the lingula. No acute airspace consolidation. There is a 3 mm right apical nodule on image 65 of series 5. No pleural effusion.    MEDIASTINUM/AXILLAE: Normal heart size. Extensive multivessel coronary artery disease with stents in place.    CORONARY ARTERY CALCIFICATION: Previous intervention (stents or CABG).    UPPER ABDOMEN: Small gallstones. Multiple hepatic hypodensities, incompletely characterized via noncontrast technique.    MUSCULOSKELETAL: Osteopenia. No acute osseous findings.      Impression    IMPRESSION:   1.  No evidence of pneumonia or pulmonary edema. There is some atelectasis or scarring in the lingula.    2.  3 mm right upper lobe pulmonary nodule. Please see follow-up guidelines below.    3.  Severe coronary artery disease, status post  stenting.    REFERENCE:  Guidelines for Management of Incidental Pulmonary Nodules Detected on CT Images: From the Fleischner Society 2017.   Guidelines apply to incidental nodules in patients who are 35 years or older.  Guidelines do not apply to lung cancer screening, patients with immunosuppression, or patients with known primary cancer.    SINGLE NODULE  Nodule size <6 mm  Low-risk patients: No follow-up needed.  High-risk patients: Optional follow-up at 12 months.     Echocardiogram Complete    Narrative    [id number]  [id number]  [id number]  [id number]^THERESE^[place] [address]     Name: JAYNE PARSON  MRN: [id number]  : 1940  Study Date: 2025 07:51 AM  Age: 84 yrs  Gender: Male  Patient Location: [facility]  Reason For Study: Dyspnea  Ordering Physician: JILLIAN ASLEH  Performed By: SONIA     BSA: 2.2 m2  Height: 69 in  Weight: 230 lb  BP: 141/69 mmHg  ______________________________________________________________________________  Procedure  Echocardiogram with two-dimensional, color and spectral Doppler. Technically  difficult study. Compared to the prior study dated 3/5/2018 ([facility] [place] [facility] no significant change. No hemodynamically significant valvular  abnormalities on 2D or color flow imaging.  ______________________________________________________________________________  Interpretation Summary     LV is poorly seen without contrast. Grossly normal in size with probably  normal systolic function, EF 55-60%. Unable to evaluate regional wall motion  due to poor endocardial border definition.  No hemodynamically significant valvular abnormalities on 2D or color flow  imaging.  Compared to the prior study dated 3/5/2018 (report in [facility]) no  significant change  ______________________________________________________________________________  Left Ventricle  The left ventricle is normal in size. Left ventricular diastolic function  is  indeterminate. Diastolic Doppler findings (E/E' ratio and/or other parameters)  suggest left ventricular filling pressures are normal. LV is poorly seen  without contrast. Grossly normal in size with probably normal systolic  function, EF 55-60%. Unable to evaluate regional wall motion due to poor  endocardial border definition.     Right Ventricle  The right ventricle is not well visualized. The right ventricular systolic  function is normal.     Atria  The left atrium is mildly dilated. Right atrial size is normal. Atrial septum  not well seen.     Mitral Valve  Mitral valve leaflets appear normal. There is trace mitral regurgitation.  There is no mitral valve stenosis.     Tricuspid Valve  The tricuspid valve is not well visualized. There is physiologic tricuspid  regurgitation. Right ventricular systolic pressure could not be approximated  due to inadequate tricuspid regurgitation. There is no tricuspid stenosis.     Aortic Valve  There is mild trileaflet aortic sclerosis. No aortic regurgitation is present.  No aortic stenosis is present.     Pulmonic Valve  The pulmonic valve is not well visualized. There is no pulmonic valvular  regurgitation. There is no pulmonic valvular stenosis.     Vessels  The aorta root is normal. The thoracic aorta is normal. Inferior vena cava not  well visualized for estimation of right atrial pressure.     Pericardium  There is no pericardial effusion.     Rhythm  Sinus rhythm was noted.  ______________________________________________________________________________  MMode/2D Measurements & Calculations  IVSd: 0.87 cm  LVIDd: 3.6 cm  LVIDs: 2.5 cm  LVPWd: 1.2 cm  FS: 30.2 %  LV mass(C)d: 113.4 grams  LV mass(C)dI: 51.7 grams/m2  Ao root diam: 3.3 cm  LA dimension: 3.4 cm  asc Aorta Diam: 3.3 cm  LA/Ao: 1.0  LVOT diam: 2.0 cm  LVOT area: 3.3 cm2  Ao root diam index Ht(cm/m): 1.9  Ao root diam index BSA (cm/m2): 1.5  Asc Ao diam index BSA (cm/m2): 1.5  Asc Ao diam index Ht(cm/m):  1.9  EF Biplane: 57.5 %  LA Volume (BP): 59.8 ml     LA Volume Index (BP): 27.3 ml/m2  LA Volume Indexed (AL/bp): 32.4 ml/m2  RWT: 0.66  TAPSE: 2.5 cm     Time Measurements  MM HR: 59.0 BPM     Doppler Measurements & Calculations  MV E max nelson: 42.7 cm/sec  MV A max nelson: 71.3 cm/sec  MV E/A: 0.60  MV dec time: 0.37 sec  LV V1 max P.9 mmHg  LV V1 max: 111.1 cm/sec  LV V1 VTI: 21.3 cm  SV(LVOT): 70.1 ml  SI(LVOT): 32.0 ml/m2  PA acc time: 0.06 sec  E/E' av.9  Lateral E/e': 6.2  Medial E/e': 7.5  RV S Nelson: 10.7 cm/sec     ______________________________________________________________________________  Report approved by: Ida Millan MD on 2025 10:22 AM

## 2025-01-09 NOTE — PLAN OF CARE
Pt is A xO X2, disoriented to time and place. Elevated temp during night and elevated respiratory rate, PRN tylenol given. Other vitals stable on RA. Sinus rhythm on tele. Denying pain. Reports shortness of breath. cough and audible wheezes present, PRN Neb given via RT. Regular diet. PIV is SL. Not oob during shift, bed alarm in place for confusion. Primofit in use.    Problem: Adult Inpatient Plan of Care  Goal: Optimal Comfort and Wellbeing  1/9/2025 0237 by Juju Fischer, RN  Outcome: Progressing  1/9/2025 0237 by Juju Fischer, RN  Outcome: Progressing     Problem: Adult Inpatient Plan of Care  Goal: Absence of Hospital-Acquired Illness or Injury  Outcome: Progressing

## 2025-01-10 ENCOUNTER — APPOINTMENT (OUTPATIENT)
Dept: OCCUPATIONAL THERAPY | Facility: CLINIC | Age: 85
DRG: 178 | End: 2025-01-10
Payer: MEDICARE

## 2025-01-10 LAB
ANION GAP SERPL CALCULATED.3IONS-SCNC: 14 MMOL/L (ref 7–15)
BUN SERPL-MCNC: 27.5 MG/DL (ref 8–23)
CALCIUM SERPL-MCNC: 8.4 MG/DL (ref 8.8–10.4)
CHLORIDE SERPL-SCNC: 102 MMOL/L (ref 98–107)
CREAT SERPL-MCNC: 1.45 MG/DL (ref 0.67–1.17)
EGFRCR SERPLBLD CKD-EPI 2021: 47 ML/MIN/1.73M2
ERYTHROCYTE [DISTWIDTH] IN BLOOD BY AUTOMATED COUNT: 13.4 % (ref 10–15)
GLUCOSE SERPL-MCNC: 90 MG/DL (ref 70–99)
HCO3 SERPL-SCNC: 20 MMOL/L (ref 22–29)
HCT VFR BLD AUTO: 36.7 % (ref 40–53)
HGB BLD-MCNC: 12.1 G/DL (ref 13.3–17.7)
MCH RBC QN AUTO: 28.7 PG (ref 26.5–33)
MCHC RBC AUTO-ENTMCNC: 33 G/DL (ref 31.5–36.5)
MCV RBC AUTO: 87 FL (ref 78–100)
PLATELET # BLD AUTO: 134 10E3/UL (ref 150–450)
POTASSIUM SERPL-SCNC: 3.6 MMOL/L (ref 3.4–5.3)
RBC # BLD AUTO: 4.21 10E6/UL (ref 4.4–5.9)
SODIUM SERPL-SCNC: 136 MMOL/L (ref 135–145)
WBC # BLD AUTO: 6.5 10E3/UL (ref 4–11)

## 2025-01-10 PROCEDURE — 250N000011 HC RX IP 250 OP 636

## 2025-01-10 PROCEDURE — 85027 COMPLETE CBC AUTOMATED: CPT

## 2025-01-10 PROCEDURE — 250N000013 HC RX MED GY IP 250 OP 250 PS 637

## 2025-01-10 PROCEDURE — 80048 BASIC METABOLIC PNL TOTAL CA: CPT

## 2025-01-10 PROCEDURE — 250N000009 HC RX 250

## 2025-01-10 PROCEDURE — 120N000001 HC R&B MED SURG/OB

## 2025-01-10 PROCEDURE — 36415 COLL VENOUS BLD VENIPUNCTURE: CPT

## 2025-01-10 PROCEDURE — 97129 THER IVNTJ 1ST 15 MIN: CPT | Mod: GO

## 2025-01-10 PROCEDURE — 99232 SBSQ HOSP IP/OBS MODERATE 35: CPT | Mod: GC

## 2025-01-10 RX ADMIN — ASPIRIN 81 MG: 81 TABLET, COATED ORAL at 09:50

## 2025-01-10 RX ADMIN — IPRATROPIUM BROMIDE AND ALBUTEROL SULFATE 3 ML: .5; 3 SOLUTION RESPIRATORY (INHALATION) at 05:31

## 2025-01-10 RX ADMIN — METOPROLOL SUCCINATE 25 MG: 25 TABLET, EXTENDED RELEASE ORAL at 09:51

## 2025-01-10 RX ADMIN — ENOXAPARIN SODIUM 40 MG: 40 INJECTION SUBCUTANEOUS at 04:01

## 2025-01-10 RX ADMIN — QUETIAPINE FUMARATE 25 MG: 25 TABLET ORAL at 22:39

## 2025-01-10 RX ADMIN — MEMANTINE 10 MG: 10 TABLET ORAL at 09:51

## 2025-01-10 RX ADMIN — FINASTERIDE 5 MG: 5 TABLET, FILM COATED ORAL at 09:52

## 2025-01-10 RX ADMIN — TAMSULOSIN HYDROCHLORIDE 0.4 MG: 0.4 CAPSULE ORAL at 17:31

## 2025-01-10 RX ADMIN — MEMANTINE 10 MG: 10 TABLET ORAL at 19:59

## 2025-01-10 ASSESSMENT — ACTIVITIES OF DAILY LIVING (ADL)
ADLS_ACUITY_SCORE: 66
ADLS_ACUITY_SCORE: 61
ADLS_ACUITY_SCORE: 66
ADLS_ACUITY_SCORE: 61
ADLS_ACUITY_SCORE: 66
ADLS_ACUITY_SCORE: 61
ADLS_ACUITY_SCORE: 66
ADLS_ACUITY_SCORE: 61
ADLS_ACUITY_SCORE: 66
ADLS_ACUITY_SCORE: 61
ADLS_ACUITY_SCORE: 61
ADLS_ACUITY_SCORE: 66
ADLS_ACUITY_SCORE: 61
ADLS_ACUITY_SCORE: 61

## 2025-01-10 NOTE — PLAN OF CARE
Problem: Fall Injury Risk  Goal: Absence of Fall and Fall-Related Injury  Outcome: Progressing  Intervention: Identify and Manage Contributors  Intervention: Promote Injury-Free Environment    Problem: Gas Exchange Impaired  Goal: Optimal Gas Exchange  Outcome: Progressing  Intervention: Optimize Oxygenation and Ventilation    [id number]: Pt is oriented to self only. All day cares included constant redirection and engagement - successful to distract patient from pulling his PIV and external catheter as well trying to get out of bed. Has been a 1:1 staff at bedside for safety since 2000 as patient was more increasing in aggressive attempts to get out of bed/pull at things.1x PRN seroquel 25mg PO given @2228. Above listed behaviors have since decreased.     VSS, afebrile Tele: SR/ST with occasional PVCs. Room Air. Max temp this shift  100.5@2016 650mg tylenol admin.Sputum sample still needed: labeled specimen cup @bedside. External cath in place w/good output.

## 2025-01-10 NOTE — PROGRESS NOTES
[facility]    Progress Note - Hospitalist Service       Date of Admission:  1/8/2025    Assessment & Plan   Patrick Green is a 84 year old male admitted on 1/8/2025. He has a history of Alzheimer's dementia, afib status post cardioversion not on anticoagulation, NSTEMI status post stents, coronary artery disease, BPH, CKD3 and is admitted for tachypnea and fall, also found to have new fever.     Tachypnea  Fever/resolving  Aspiration pneumonitis  Atelectasis  Presented with tachypnea, increased disorientation on day of admission, now with new fever during admission concerning for infection. Differential diagnosis includes aspiration pneumonitis, viral respiratory infection, CHF exacerbation, UTI, and COPD (per son, patient quit smoking more than 30 years ago). Chest CT on admission did not show PNA or pulmonary edema; BNP, procal, and WBC were normal. SLP performed a swallow evaluation today which was normal, making aspiration pneumonitis less likely. CHF exacerbation less likely given normal BNP and echo. Urinalysis was without signs of UTI. Blood culture is negative. Currently there is no indication for antibiotics.   - Cardiac telemetry  - Duonebs q4h PRN for wheezing  - PRN Tylenol for fever     Fall  Per son, patient rarely falls. Given above symptoms, this could be causing balance issues/weakness leading to fall. Imaging on admission negative for acute changes/fractures.  - PT/OT/SW for dispo planning - patient lives with ji Babin but son Jose Antonio is main point of contact  - PRN Tylenol for back pain     Elevated troponin  Troponin 25 --> 27 --> 31 on admission. EKG with sinus tachycardia and PVCs. Patient denies chest pain. Low suspicion for ACS; could be demand ischemia.     CKD3  Cr on admission 1.51. Baseline Cr per Care Everywhere appears to be around 1.3.  - Trend Cr     R pulmonary nodule  3 mm RUL pulmonary nodule seen on admission imaging. Fleischner recs below. Informed  "son Jose Antonio over the phone.  SINGLE NODULE   Nodule size <6 mm   Low-risk patients: No follow-up needed.   High-risk patients: Optional follow-up at 12 months.          Diet: Regular Diet Adult    DVT Prophylaxis: Enoxaparin (Lovenox) SQ  Ann Catheter: Not present  Fluids: None  Lines: None     Cardiac Monitoring: ACTIVE order. Indication: Tachypnea  Code Status: No CPR- Do NOT Intubate      Clinically Significant Risk Factors                       # Dementia: noted on problem list        # Obesity: Estimated body mass index is 33.97 kg/m  as calculated from the following:    Height as of this encounter: 1.753 m (5' 9\").    Weight as of this encounter: 104.3 kg (230 lb)., PRESENT ON ADMISSION     # Financial/Environmental Concerns: none         Social Drivers of Health   Tobacco Use: Medium Risk (1/8/2025)    Patient History     Smoking Tobacco Use: Former     Smokeless Tobacco Use: Never         Disposition Plan     Medically Ready for Discharge: Anticipated Tomorrow         The patient's care was discussed with the Attending Physician, Dr. Jha .    Jeanne Garrett  Medical Student  Hospitalist Service  [facility]  Securely message with [employer] (more info)  Text page via [employer] Paging/Directory   ______________________________________________________________________    Interval History   Elevated temperature overnight. Was given PRN tylenol. Patient denied having shortness of breath, cough, chills, and fatigue. Reported that he was feeling well.     Physical Exam   Vital Signs: Temp: 98  F (36.7  C) Temp src: Oral BP: 138/65 Pulse: 66   Resp: 22 SpO2: 93 % O2 Device: None (Room air)    Weight: 230 lbs 0 oz    General Appearance: alert, no acute distress, resting in bed   HEENT: normocephalic, no nasal discharge  Respiratory: Wheezing appreciated on auscultation bilaterally across all fields   Cardiovascular: RRR, normal S1 and S2, no murmurs. No LE edema bilaterally.  GI: soft, +BS, " nontender to palpation  Lymph/Hematologic: no cervical lymphadenopathy  Skin: no significant lesions, bruising or rashes on visible skin  Musculoskeletal: no gross musculoskeletal deformities  Neurologic: alert but not oriented to time and place   Psychiatric: pleasant affect    Medical Decision Making       Please see A&P for additional details of medical decision making.      Data     I have personally reviewed the following data over the past 24 hrs:    6.5  \   12.1 (L)   / 134 (L)     136 102 27.5 (H) /  90   3.6 20 (L) 1.45 (H) \       Imaging results reviewed over the past 24 hrs:   Recent Results (from the past 24 hours)   Echocardiogram Complete    Narrative    [id number]  [id number]  [id number]  [id number]^THERESE^[place] [address]     Name: JAYNE PARSON  MRN: [id number]  : 1940  Study Date: 2025 07:51 AM  Age: 84 yrs  Gender: Male  Patient Location: [facility]  Reason For Study: Dyspnea  Ordering Physician: JILLIAN SALEH  Performed By: SONIA     BSA: 2.2 m2  Height: 69 in  Weight: 230 lb  BP: 141/69 mmHg  ______________________________________________________________________________  Procedure  Echocardiogram with two-dimensional, color and spectral Doppler. Technically  difficult study. Compared to the prior study dated 3/5/2018 ([facility] no significant change. No hemodynamically significant valvular  abnormalities on 2D or color flow imaging.  ______________________________________________________________________________  Interpretation Summary     LV is poorly seen without contrast. Grossly normal in size with probably  normal systolic function, EF 55-60%. Unable to evaluate regional wall motion  due to poor endocardial border definition.  No hemodynamically significant valvular abnormalities on 2D or color flow  imaging.  Compared to the prior study dated 3/5/2018 (report in [facility]) no  significant  change  ______________________________________________________________________________  Left Ventricle  The left ventricle is normal in size. Left ventricular diastolic function is  indeterminate. Diastolic Doppler findings (E/E' ratio and/or other parameters)  suggest left ventricular filling pressures are normal. LV is poorly seen  without contrast. Grossly normal in size with probably normal systolic  function, EF 55-60%. Unable to evaluate regional wall motion due to poor  endocardial border definition.     Right Ventricle  The right ventricle is not well visualized. The right ventricular systolic  function is normal.     Atria  The left atrium is mildly dilated. Right atrial size is normal. Atrial septum  not well seen.     Mitral Valve  Mitral valve leaflets appear normal. There is trace mitral regurgitation.  There is no mitral valve stenosis.     Tricuspid Valve  The tricuspid valve is not well visualized. There is physiologic tricuspid  regurgitation. Right ventricular systolic pressure could not be approximated  due to inadequate tricuspid regurgitation. There is no tricuspid stenosis.     Aortic Valve  There is mild trileaflet aortic sclerosis. No aortic regurgitation is present.  No aortic stenosis is present.     Pulmonic Valve  The pulmonic valve is not well visualized. There is no pulmonic valvular  regurgitation. There is no pulmonic valvular stenosis.     Vessels  The aorta root is normal. The thoracic aorta is normal. Inferior vena cava not  well visualized for estimation of right atrial pressure.     Pericardium  There is no pericardial effusion.     Rhythm  Sinus rhythm was noted.  ______________________________________________________________________________  MMode/2D Measurements & Calculations  IVSd: 0.87 cm  LVIDd: 3.6 cm  LVIDs: 2.5 cm  LVPWd: 1.2 cm  FS: 30.2 %  LV mass(C)d: 113.4 grams  LV mass(C)dI: 51.7 grams/m2  Ao root diam: 3.3 cm  LA dimension: 3.4 cm  asc Aorta Diam: 3.3 cm  LA/Ao:  1.0  LVOT diam: 2.0 cm  LVOT area: 3.3 cm2  Ao root diam index Ht(cm/m): 1.9  Ao root diam index BSA (cm/m2): 1.5  Asc Ao diam index BSA (cm/m2): 1.5  Asc Ao diam index Ht(cm/m): 1.9  EF Biplane: 57.5 %  LA Volume (BP): 59.8 ml     LA Volume Index (BP): 27.3 ml/m2  LA Volume Indexed (AL/bp): 32.4 ml/m2  RWT: 0.66  TAPSE: 2.5 cm     Time Measurements  MM HR: 59.0 BPM     Doppler Measurements & Calculations  MV E max nelson: 42.7 cm/sec  MV A max nelson: 71.3 cm/sec  MV E/A: 0.60  MV dec time: 0.37 sec  LV V1 max P.9 mmHg  LV V1 max: 111.1 cm/sec  LV V1 VTI: 21.3 cm  SV(LVOT): 70.1 ml  SI(LVOT): 32.0 ml/m2  PA acc time: 0.06 sec  E/E' av.9  Lateral E/e': 6.2  Medial E/e': 7.5  RV S Nelson: 10.7 cm/sec     ______________________________________________________________________________  Report approved by: Ida Millan MD on 2025 10:22 AM

## 2025-01-10 NOTE — PROGRESS NOTES
"Care Management Follow Up    Length of Stay (days): 1    Expected Discharge Date: 01/11/2025     Concerns to be Addressed: discharge planning       Patient plan of care discussed at interdisciplinary rounds: Yes    Anticipated Discharge Disposition: Transitional Care     Anticipated Discharge Services: Transportation Services (TBD)    Anticipated Discharge DME: none    Patient/family educated on Medicare website which has current facility and service quality ratings: yes    Education Provided on the Discharge Plan: Yes (AVS per bedside RN)    Patient/Family in Agreement with the Plan: yes (Son Olaf reportedly may be opposed to placement and wish to bring pt home)    Referrals Placed by CM/SW: Post Acute Facilities         Additional Information:  CM reviewed chart.   CM spoke to admissions at [facility] to coordinate discharge. CM asked about insurance coverage. Admissions staff is going to to look into this admissions and call CM back. 8:10 AM   CM spoke to Jose Antonio (son). [facility] is considering patient going home with home care. Patient does not have 24/7 care but Jose Antonio said \"it's as close as humanly possible\". CM updated [facility] as to the TCU's that are pending and the TCU considering. CM did discuss with Jose Antonio that patient needs 3 nights inpatient status to get TCU coverage. [facility] is considering but would require a $7,000 deposit if patient does not have the 3 night inpatient stay. Jose Antonio said he wasn't sure if that would work. If patient privately paid for TCU [facility] said family would be picky about the TCU. Jose Antonio wants to talk to his brother about what they want to do - home with home care vs TCU and update CM. 12:49 PM   CM will follow.         Next Steps: TCU vs home      Accepting TCU - changed to considering  [facility]  [address]  [facility] [address]       TCU pending  [facility]   [address] [facility] [address]       [facility] [place]   [address] [facility] [address]       [facility]   [address] [place] [address]           CM completed PAS 01/10/25 - [id number]        Elena Pizano RN  Care Coordinator

## 2025-01-10 NOTE — PLAN OF CARE
Per [facility] resident, the patient's son, wanted to be notified of PRN Seroquel admin. This writer called Jose Antonio and updated him. He stated he had no questions or concerns at this time.

## 2025-01-10 NOTE — PLAN OF CARE
Problem: Adult Inpatient Plan of Care  Goal: Absence of Hospital-Acquired Illness or Injury  Intervention: Identify and Manage Fall Risk  Recent Flowsheet Documentation  Taken 1/10/2025 1309 by Genie Spivey RN  Safety Promotion/Fall Prevention:   safety round/check completed   room organization consistent   room near nurse's station   room door open   patient and family education   nonskid shoes/slippers when out of bed   clutter free environment maintained     Problem: Adult Inpatient Plan of Care  Goal: Absence of Hospital-Acquired Illness or Injury  Intervention: Prevent Infection  Recent Flowsheet Documentation  Taken 1/10/2025 1309 by Genie Spivey RN  Infection Prevention: single patient room provided     Problem: Adult Inpatient Plan of Care  Goal: Optimal Comfort and Wellbeing  Intervention: Monitor Pain and Promote Comfort  Recent Flowsheet Documentation  Taken 1/10/2025 1305 by Genie Spivey RN  Pain Management Interventions: repositioned     Problem: Fall Injury Risk  Goal: Absence of Fall and Fall-Related Injury  Intervention: Promote Injury-Free Environment  Recent Flowsheet Documentation  Taken 1/10/2025 1309 by Genie Spivey RN  Safety Promotion/Fall Prevention:   safety round/check completed   room organization consistent   room near nurse's station   room door open   patient and family education   nonskid shoes/slippers when out of bed   clutter free environment maintained     Problem: Gas Exchange Impaired  Goal: Optimal Gas Exchange  Intervention: Optimize Oxygenation and Ventilation  Recent Flowsheet Documentation  Taken 1/10/2025 1309 by Genie Spivey RN  Head of Bed (HOB) Positioning: HOB at 60-90 degrees    Goal Outcome Evaluation: Received patient from the ED, A&Ox2, disoriented to time and situation, re-orientable, express needs appropriately, VSS afebrile on room air, denied pain, oral intake tolerated well having lunch in bed at this time. Will continue to monitor. M  Admission: 6- Walk 10 steps or more. HLM Daily6- Walk 10 steps or more. -Genie DOBBINS RN

## 2025-01-11 ENCOUNTER — APPOINTMENT (OUTPATIENT)
Dept: PHYSICAL THERAPY | Facility: CLINIC | Age: 85
DRG: 178 | End: 2025-01-11
Payer: MEDICARE

## 2025-01-11 LAB
ANION GAP SERPL CALCULATED.3IONS-SCNC: 12 MMOL/L (ref 7–15)
BUN SERPL-MCNC: 20.8 MG/DL (ref 8–23)
CALCIUM SERPL-MCNC: 8.1 MG/DL (ref 8.8–10.4)
CHLORIDE SERPL-SCNC: 103 MMOL/L (ref 98–107)
CREAT SERPL-MCNC: 1.29 MG/DL (ref 0.67–1.17)
EGFRCR SERPLBLD CKD-EPI 2021: 54 ML/MIN/1.73M2
GLUCOSE SERPL-MCNC: 104 MG/DL (ref 70–99)
HCO3 SERPL-SCNC: 21 MMOL/L (ref 22–29)
HOLD SPECIMEN: NORMAL
POTASSIUM SERPL-SCNC: 3.8 MMOL/L (ref 3.4–5.3)
SODIUM SERPL-SCNC: 136 MMOL/L (ref 135–145)

## 2025-01-11 PROCEDURE — 82565 ASSAY OF CREATININE: CPT

## 2025-01-11 PROCEDURE — 80048 BASIC METABOLIC PNL TOTAL CA: CPT

## 2025-01-11 PROCEDURE — 250N000013 HC RX MED GY IP 250 OP 250 PS 637

## 2025-01-11 PROCEDURE — 250N000011 HC RX IP 250 OP 636

## 2025-01-11 PROCEDURE — 99231 SBSQ HOSP IP/OBS SF/LOW 25: CPT | Mod: GC

## 2025-01-11 PROCEDURE — 36415 COLL VENOUS BLD VENIPUNCTURE: CPT

## 2025-01-11 PROCEDURE — 85049 AUTOMATED PLATELET COUNT: CPT

## 2025-01-11 PROCEDURE — 120N000001 HC R&B MED SURG/OB

## 2025-01-11 PROCEDURE — 97110 THERAPEUTIC EXERCISES: CPT | Mod: GP

## 2025-01-11 PROCEDURE — 97116 GAIT TRAINING THERAPY: CPT | Mod: GP

## 2025-01-11 RX ADMIN — MEMANTINE 10 MG: 10 TABLET ORAL at 10:56

## 2025-01-11 RX ADMIN — TAMSULOSIN HYDROCHLORIDE 0.4 MG: 0.4 CAPSULE ORAL at 16:07

## 2025-01-11 RX ADMIN — MEMANTINE 10 MG: 10 TABLET ORAL at 21:19

## 2025-01-11 RX ADMIN — ENOXAPARIN SODIUM 40 MG: 40 INJECTION SUBCUTANEOUS at 04:13

## 2025-01-11 RX ADMIN — METOPROLOL SUCCINATE 25 MG: 25 TABLET, EXTENDED RELEASE ORAL at 10:56

## 2025-01-11 RX ADMIN — QUETIAPINE FUMARATE 25 MG: 25 TABLET ORAL at 22:14

## 2025-01-11 RX ADMIN — FINASTERIDE 5 MG: 5 TABLET, FILM COATED ORAL at 10:56

## 2025-01-11 RX ADMIN — ASPIRIN 81 MG: 81 TABLET, COATED ORAL at 10:56

## 2025-01-11 ASSESSMENT — ACTIVITIES OF DAILY LIVING (ADL)
ADLS_ACUITY_SCORE: 61
ADLS_ACUITY_SCORE: 66
ADLS_ACUITY_SCORE: 61
ADLS_ACUITY_SCORE: 66
ADLS_ACUITY_SCORE: 61

## 2025-01-11 NOTE — PROGRESS NOTES
[facility]    Progress Note - Hospitalist Service       Date of Admission:  1/8/2025    Assessment & Plan   Patrick Green is a 84 year old male admitted on 1/8/2025. He has a history of Alzheimer's dementia, afib status post cardioversion not on anticoagulation, NSTEMI status post stents, coronary artery disease, BPH, CKD3 and is admitted for tachypnea and fall, also found to have new fever.     Tachypnea  Fever/resolving  Aspiration pneumonitis  Atelectasis  Presented with tachypnea, increased disorientation on day of admission, now with new fever during admission concerning for infection. Differential diagnosis includes aspiration pneumonitis, viral respiratory infection, CHF exacerbation, UTI, and COPD (per son, patient quit smoking more than 30 years ago). Chest CT on admission did not show PNA or pulmonary edema; BNP, procal, and WBC were normal. SLP performed a swallow evaluation today which was normal, making aspiration pneumonitis less likely. CHF exacerbation less likely given normal BNP and echo. Urinalysis was without signs of UTI. Blood culture is negative. Currently there is no indication for antibiotics.   - Duonebs q4h PRN for wheezing  - PRN Tylenol for fever     Fall  Per son, patient rarely falls. Given above symptoms, this could be causing balance issues/weakness leading to fall. Imaging on admission negative for acute changes/fractures.  - PT/Ot recommended TCU and family is try to figure out what they want to do  - PRN Tylenol for back pain     Elevated troponin  Troponin 25 --> 27 --> 31 on admission. EKG with sinus tachycardia and PVCs. Patient denies chest pain. Low suspicion for ACS; could be demand ischemia.     CKD3  Cr on admission 1.51. Baseline Cr per Care Everywhere appears to be around 1.3.Currently at baseline     R pulmonary nodule  3 mm RUL pulmonary nodule seen on admission imaging. Bebeto recs below. Informed son Jose Antonio over the phone.  SINGLE  "NODULE   Nodule size <6 mm   Low-risk patients: No follow-up needed.   High-risk patients: Optional follow-up at 12 months.          Diet: Regular Diet Adult    DVT Prophylaxis: Enoxaparin (Lovenox) SQ  Ann Catheter: Not present  Fluids: None  Lines: None     Cardiac Monitoring: ACTIVE order. Indication: Tachypnea  Code Status: No CPR- Do NOT Intubate      Clinically Significant Risk Factors                       # Dementia: noted on problem list        # Obesity: Estimated body mass index is 33.99 kg/m  as calculated from the following:    Height as of this encounter: 1.753 m (5' 9\").    Weight as of this encounter: 104.4 kg (230 lb 3.2 oz)., PRESENT ON ADMISSION     # Financial/Environmental Concerns: none         Social Drivers of Health   Tobacco Use: Medium Risk (1/8/2025)    Patient History     Smoking Tobacco Use: Former     Smokeless Tobacco Use: Never         Disposition Plan     Medically Ready for Discharge: Anticipated Tomorrow         The patient's care was discussed with the Attending Physician, Dr. Jha .    Jeanne Garrett  Medical Student  Hospitalist Service  [facility]  Securely message with [employer] (more info)  Text page via [employer] Paging/Directory   ______________________________________________________________________    Interval History   No new acute event overnight. Pt has not spiked fever. OT/PT reccommended TCU.    Physical Exam   Vital Signs: Temp: 97.8  F (36.6  C) Temp src: Oral BP: 130/77 Pulse: 73   Resp: 16 SpO2: 94 % O2 Device: None (Room air)    Weight: 230 lbs 3.2 oz    General Appearance: alert, no acute distress, resting in bed   HEENT: normocephalic, no nasal discharge  Respiratory: Wheezing appreciated on auscultation bilaterally across all fields   Cardiovascular: RRR, normal S1 and S2, no murmurs. No LE edema bilaterally.  GI: soft, +BS, nontender to palpation  Lymph/Hematologic: no cervical lymphadenopathy  Skin: no significant lesions, bruising or " rashes on visible skin  Musculoskeletal: no gross musculoskeletal deformities  Neurologic: alert but not oriented to time and place   Psychiatric: pleasant affect    Medical Decision Making       Please see A&P for additional details of medical decision making.      Data         Imaging results reviewed over the past 24 hrs:   No results found for this or any previous visit (from the past 24 hours).

## 2025-01-11 NOTE — PLAN OF CARE
Pt is AxO x1/2, disoriented to time, situation, and at time place. reorientation provided. Slightly elevated BP, other vitals stable on RA. Sinus rhythm on tele. Denying pain and SOB. Seroquel given at bedtime. Resting calmly overnight. Ax1 with GBW. Regular diet. PIV is SL.     Problem: Gas Exchange Impaired  Goal: Optimal Gas Exchange  Outcome: Progressing     Problem: Fall Injury Risk  Goal: Absence of Fall and Fall-Related Injury  Outcome: Progressing

## 2025-01-11 NOTE — PLAN OF CARE
Patient is alert and orientedx1, cable to make needs known. Assist of 1 with walker and gait belt. Vitals signs are stable on room air. Pt is tolerating regular diet. Saline locked. Pt is voiding appropriately. Awaiting placement for discharge.   Kayla Odonnell RN  January 10, 2025, 6:10 PM     Problem: Adult Inpatient Plan of Care  Goal: Absence of Hospital-Acquired Illness or Injury  Intervention: Identify and Manage Fall Risk  Recent Flowsheet Documentation  Taken 1/10/2025 1807 by Kayla Odonnell RN  Safety Promotion/Fall Prevention:   safety round/check completed   room organization consistent   room near nurse's station   room door open   patient and family education   nonskid shoes/slippers when out of bed   clutter free environment maintained  Taken 1/10/2025 1733 by Kayla Odonnell RN  Safety Promotion/Fall Prevention:   safety round/check completed   room organization consistent   room near nurse's station   room door open   patient and family education   nonskid shoes/slippers when out of bed   clutter free environment maintained  Intervention: Prevent Infection  Recent Flowsheet Documentation  Taken 1/10/2025 1733 by Kayla Odonnell RN  Infection Prevention: single patient room provided     Problem: Pain Acute  Goal: Optimal Pain Control and Function  Intervention: Prevent or Manage Pain  Recent Flowsheet Documentation  Taken 1/10/2025 1806 by Kayla Odonnell RN  Medication Review/Management: medications reviewed  Taken 1/10/2025 1733 by Kayla Odonnell RN  Medication Review/Management: medications reviewed     Problem: Fall Injury Risk  Goal: Absence of Fall and Fall-Related Injury  Outcome: Progressing  Intervention: Identify and Manage Contributors  Flowsheets  Taken 1/10/2025 1806  Medication Review/Management: medications reviewed  Taken 1/10/2025 1733  Medication Review/Management: medications reviewed  Intervention: Promote Injury-Free Environment  Flowsheets  Taken 1/10/2025 1807  Safety Promotion/Fall  Prevention:   safety round/check completed   room organization consistent   room near nurse's station   room door open   patient and family education   nonskid shoes/slippers when out of bed   clutter free environment maintained  Taken 1/10/2025 1738  Safety Promotion/Fall Prevention:   safety round/check completed   room organization consistent   room near nurse's station   room door open   patient and family education   nonskid shoes/slippers when out of bed   clutter free environment maintained   Goal Outcome Evaluation:

## 2025-01-12 LAB
CREAT SERPL-MCNC: 1.3 MG/DL (ref 0.67–1.17)
EGFRCR SERPLBLD CKD-EPI 2021: 53 ML/MIN/1.73M2
PLATELET # BLD AUTO: 125 10E3/UL (ref 150–450)

## 2025-01-12 PROCEDURE — 120N000001 HC R&B MED SURG/OB

## 2025-01-12 PROCEDURE — 250N000011 HC RX IP 250 OP 636

## 2025-01-12 PROCEDURE — 250N000013 HC RX MED GY IP 250 OP 250 PS 637

## 2025-01-12 PROCEDURE — 99231 SBSQ HOSP IP/OBS SF/LOW 25: CPT | Mod: GC

## 2025-01-12 RX ADMIN — MEMANTINE 10 MG: 10 TABLET ORAL at 19:52

## 2025-01-12 RX ADMIN — MEMANTINE 10 MG: 10 TABLET ORAL at 09:50

## 2025-01-12 RX ADMIN — ENOXAPARIN SODIUM 40 MG: 40 INJECTION SUBCUTANEOUS at 04:36

## 2025-01-12 RX ADMIN — FINASTERIDE 5 MG: 5 TABLET, FILM COATED ORAL at 09:50

## 2025-01-12 RX ADMIN — METOPROLOL SUCCINATE 25 MG: 25 TABLET, EXTENDED RELEASE ORAL at 09:55

## 2025-01-12 RX ADMIN — ASPIRIN 81 MG: 81 TABLET, COATED ORAL at 09:51

## 2025-01-12 RX ADMIN — TAMSULOSIN HYDROCHLORIDE 0.4 MG: 0.4 CAPSULE ORAL at 18:17

## 2025-01-12 RX ADMIN — QUETIAPINE FUMARATE 25 MG: 25 TABLET ORAL at 19:52

## 2025-01-12 ASSESSMENT — ACTIVITIES OF DAILY LIVING (ADL)
ADLS_ACUITY_SCORE: 63
ADLS_ACUITY_SCORE: 57
ADLS_ACUITY_SCORE: 63
ADLS_ACUITY_SCORE: 61
ADLS_ACUITY_SCORE: 63
ADLS_ACUITY_SCORE: 57
ADLS_ACUITY_SCORE: 63
ADLS_ACUITY_SCORE: 61
ADLS_ACUITY_SCORE: 63
ADLS_ACUITY_SCORE: 63
ADLS_ACUITY_SCORE: 61
ADLS_ACUITY_SCORE: 57
ADLS_ACUITY_SCORE: 57
ADLS_ACUITY_SCORE: 63
ADLS_ACUITY_SCORE: 61

## 2025-01-12 NOTE — PROGRESS NOTES
At the request of patient's daughter in law Krishna Green, to speak with Care Management about discharge placement, Writer informed Elena ESTRADA(CHAPARRITA) to call her back. Writer spoke with Elena and gave Krishna's contact number. -Genie DOBBINS RN

## 2025-01-12 NOTE — PLAN OF CARE
Problem: Adult Inpatient Plan of Care  Goal: Absence of Hospital-Acquired Illness or Injury  Intervention: Identify and Manage Fall Risk  Recent Flowsheet Documentation  Taken 1/12/2025 0810 by Genie Spivey RN  Safety Promotion/Fall Prevention: safety round/check completed     Problem: Adult Inpatient Plan of Care  Goal: Absence of Hospital-Acquired Illness or Injury  Intervention: Prevent Skin Injury  Recent Flowsheet Documentation  Taken 1/12/2025 0810 by Genie Spivey RN  Body Position: supine, head elevated  Skin Protection:   adhesive use limited   tubing/devices free from skin contact     Problem: Adult Inpatient Plan of Care  Goal: Absence of Hospital-Acquired Illness or Injury  Intervention: Prevent and Manage VTE (Venous Thromboembolism) Risk  Recent Flowsheet Documentation  Taken 1/12/2025 0810 by Genie Spivey RN  VTE Prevention/Management: SCDs off (sequential compression devices)     Problem: Adult Inpatient Plan of Care  Goal: Absence of Hospital-Acquired Illness or Injury  Intervention: Prevent Infection  Recent Flowsheet Documentation  Taken 1/12/2025 0810 by Genie Spivey RN  Infection Prevention:   hand hygiene promoted   rest/sleep promoted   single patient room provided     Problem: Adult Inpatient Plan of Care  Goal: Optimal Comfort and Wellbeing  Outcome: Progressing    Goal Outcome Evaluation: Alert and oriented to person, self, disoriented to time, situation, and place, redirectable, able to make needs known, oral intake is good, VSS, afebrile, on room air, and denied pain. Sitting up on the chair watching news, BFM team stopped by to check no new orders given at this time. Pending placement TCU versus Home with home care, patient's son to make decision. Will continue to monitor. - Genie DOBBINS RN

## 2025-01-12 NOTE — PROGRESS NOTES
[facility]    Progress Note - Hospitalist Service       Date of Admission:  1/8/2025    Assessment & Plan   Patrick Green is a 84 year old male admitted on 1/8/2025. He has a history of Alzheimer's dementia, afib status post cardioversion not on anticoagulation, NSTEMI status post stents, coronary artery disease, BPH, CKD3 and is admitted for tachypnea and fall, also found to have new fever on admission which is now resolved.    Patient is medically stable for discharge. Plan for discharge to TCU tomorrow. See Elena Pizano note for details.    Tachypnea, resolved  Fever, resolved  Concern for aspiration pneumonitis  Atelectasis  Presented with tachypnea, increased disorientation, and fever on admission concerning for infection. Differential diagnosis included aspiration pneumonitis, viral respiratory infection, CHF exacerbation, UTI, and COPD (per son, patient quit smoking more than 30 years ago). Chest CT on admission did not show PNA or pulmonary edema; BNP, procal, and WBC were normal. SLP performed a swallow evaluation which was normal, making aspiration pneumonitis less likely. CHF exacerbation less likely given normal BNP and echo. Urinalysis was without signs of UTI. Blood culture is negative. Did not treat with antibiotics. No fever since 1/9/24 PM. Suspect etiology of initial presentation was viral upper respiratory illness.  - Duonebs q4h PRN for wheezing  - PRN Tylenol for fever     Fall  Per son, patient rarely falls. Above symptoms could have been contributing to balance issues/weakness leading to fall. Imaging on admission negative for acute changes/fractures.  - PT/OT consulted and recommended TCU  - PRN Tylenol for back pain     Elevated troponin  Troponin 25 --> 27 --> 31 on admission. EKG with sinus tachycardia and PVCs. Patient denies chest pain. Low suspicion for ACS; could be demand ischemia.     CKD3  Cr on admission 1.51. Baseline Cr per Care Everywhere appears to  "be around 1.3. Currently at baseline     R pulmonary nodule  3 mm RUL pulmonary nodule seen on admission imaging. Fleischner recs below. Informed son Jose Antonio over the phone.  SINGLE NODULE   Nodule size <6 mm   Low-risk patients: No follow-up needed.   High-risk patients: Optional follow-up at 12 months.          Diet: Regular Diet Adult    DVT Prophylaxis: Enoxaparin (Lovenox) SQ  Ann Catheter: Not present  Fluids: None  Lines: None     Cardiac Monitoring: None  Code Status: No CPR- Do NOT Intubate      Clinically Significant Risk Factors                 # Thrombocytopenia: Lowest platelets = 125 in last 2 days, will monitor for bleeding       # Dementia: noted on problem list        # Obesity: Estimated body mass index is 33.2 kg/m  as calculated from the following:    Height as of this encounter: 1.753 m (5' 9\").    Weight as of this encounter: 102 kg (224 lb 12.8 oz)., PRESENT ON ADMISSION     # Financial/Environmental Concerns: none         Social Drivers of Health   Tobacco Use: Medium Risk (1/8/2025)    Patient History     Smoking Tobacco Use: Former     Smokeless Tobacco Use: Never         Disposition Plan     Medically Ready for Discharge: Anticipated Tomorrow       The patient's care was discussed with the Attending Physician, Dr. Jha .    Veronica Gong MD  Hospitalist Service  [facility]  Securely message with igobubble (more info)  Text page via [employer] Paging/Directory   ______________________________________________________________________    Interval History   Nursing notes reviewed. No acute events overnight. Patient comfortable, doing well eating his breakfast.    Physical Exam   Vital Signs: Temp: 97.5  F (36.4  C) Temp src: Oral BP: 139/68 Pulse: 77   Resp: 16 SpO2: 93 % O2 Device: None (Room air)    Weight: 224 lbs 12.8 oz    General Appearance: alert, no acute distress, sitting comfortably in chair after eating breakfast  HEENT: normocephalic, no nasal " discharge  Respiratory: anterior lung fields clear to auscultation  Cardiovascular: RRR, normal S1 and S2, no murmurs  Skin: no significant lesions, bruising or rashes on visible skin  Musculoskeletal: no gross musculoskeletal deformities  Neurologic: alert but not oriented to time and place, oriented to self   Psychiatric: pleasant affect    Medical Decision Making       Please see A&P for additional details of medical decision making.      Data     I have personally reviewed the following data over the past 24 hrs:    N/A  \   N/A   / 125 (L)     N/A N/A N/A /  N/A   N/A N/A 1.30 (H) \       Imaging results reviewed over the past 24 hrs:   No results found for this or any previous visit (from the past 24 hours).

## 2025-01-12 NOTE — PROGRESS NOTES
Care Management Follow Up    Length of Stay (days): 3    Expected Discharge Date: 01/13/2025     Concerns to be Addressed: discharge planning      Patient plan of care discussed at interdisciplinary rounds: Yes    Anticipated Discharge Disposition: Transitional Care     Anticipated Discharge Services: Transportation Services (TBD)    Anticipated Discharge DME: None    Patient/family educated on Medicare website which has current facility and service quality ratings: yes    Education Provided on the Discharge Plan: Yes (AVS per bedside RN)    Patient/Family in Agreement with the Plan: yes    Referrals Placed by CM/SW: Post Acute Facilities        Additional Information:  CM reviewed chart. CM spoke to Krishna (daughter-in-law). Krishna prefers TCU. Families first choice would be [facility]. Ok with cities close to [place] ([place], [place], [place]). Transportation TBD. CM will follow.     Next Steps: accepting TCU and transportation      PAS competed on 1/10/25 for [facility] (If patient discharge to another facility CM will need to update Senior Linkage Line)      TCU considering  [facility] (TCU)   [address]  [place] [address]       TCU pending  [facility] (Preferred)  [address] [facility] [phone]       [facility]   [address] [facility] [place] [address]       [facility]   [address] [place] [address]       [facility]   [address]  [place] [address]       [facility]   [address] [place] [address]       [facility]   [address]  [place] [address]         TCU declined  [facility]   [address] [place] [address]           Elena Pizano, TERESSA  Care Coordinator

## 2025-01-12 NOTE — PLAN OF CARE
Goal Outcome Evaluation:    Patient is alert to self only. Reorientation provided often. VSS on RA. Denies pain, sob, chest pain, dizziness, nor n/v. PIV is intact and saline locked. Ambulates A1 with walker and gait belt, steady. Voiding spontaneously. Alarms are on and audible for safety. Call light within reach.     Problem: Adult Inpatient Plan of Care  Goal: Absence of Hospital-Acquired Illness or Injury  Intervention: Identify and Manage Fall Risk  Recent Flowsheet Documentation  Taken 1/11/2025 2352 by Cordelia Williamson RN  Safety Promotion/Fall Prevention: safety round/check completed  Intervention: Prevent Skin Injury  Recent Flowsheet Documentation  Taken 1/11/2025 2352 by Cordelia Williamson RN  Body Position: supine, head elevated  Skin Protection:   adhesive use limited   tubing/devices free from skin contact  Intervention: Prevent and Manage VTE (Venous Thromboembolism) Risk  Recent Flowsheet Documentation  Taken 1/11/2025 2352 by Cordelia Williamson RN  VTE Prevention/Management: SCDs off (sequential compression devices)  Intervention: Prevent Infection  Recent Flowsheet Documentation  Taken 1/11/2025 2352 by Cordelia Williamson RN  Infection Prevention:   hand hygiene promoted   rest/sleep promoted   single patient room provided  Goal: Optimal Comfort and Wellbeing  Outcome: Progressing  Intervention: Monitor Pain and Promote Comfort  Recent Flowsheet Documentation  Taken 1/11/2025 2352 by Cordelia Williamson RN  Pain Management Interventions:   rest   repositioned     Problem: Pain Acute  Goal: Optimal Pain Control and Function  Outcome: Progressing  Intervention: Develop Pain Management Plan  Recent Flowsheet Documentation  Taken 1/11/2025 2352 by Cordelia Williamson RN  Pain Management Interventions:   rest   repositioned     Problem: Fall Injury Risk  Goal: Absence of Fall and Fall-Related Injury  Outcome: Progressing  Intervention: Promote Injury-Free Environment  Recent Flowsheet Documentation  Taken 1/11/2025 2352 by Clay  Cordelia OQUENDO RN  Safety Promotion/Fall Prevention: safety round/check completed     Problem: Gas Exchange Impaired  Goal: Optimal Gas Exchange  Outcome: Progressing  Intervention: Optimize Oxygenation and Ventilation  Recent Flowsheet Documentation  Taken 1/11/2025 4504 by Cordelia Williamson, RN  Head of Bed (HOB) Positioning: HOB at 30-45 degrees

## 2025-01-12 NOTE — PLAN OF CARE
Problem: Fall Injury Risk  Goal: Absence of Fall and Fall-Related Injury  Intervention: Promote Injury-Free Environment  Recent Flowsheet Documentation  Taken 1/11/2025 1613 by Genie Lopez, RN  Safety Promotion/Fall Prevention:   activity supervised   assistive device/personal items within reach   clutter free environment maintained   nonskid shoes/slippers when out of bed   room near nurse's station   room organization consistent   safety round/check completed  Taken 1/11/2025 1100 by Genie Lopez, RN  Safety Promotion/Fall Prevention:   activity supervised   assistive device/personal items within reach   clutter free environment maintained   nonskid shoes/slippers when out of bed   room near nurse's station   room organization consistent   safety round/check completed     Pt alert to self only. Reorienting utilized. Pleasant with cares. VSS on RA. Denies pain and SOB. Remained off 1:1 this shift. Alarms on for safety. Tolerating po intake. Voiding spontaneously. Up A1 walker and GB. Worked with PT today needing redirection and reminders frequently. Son Jose Antonio updated today. Discharge pending safe discharge plan.     Genie Lopez, TERESSA  [phone]

## 2025-01-12 NOTE — PLAN OF CARE
Pt alert to self only. Reorientation provided. Denies pain. VSS on RA. Ambulates A1 walker and GB. Mepilex in place for skin tear on elbow. PIV SL. Remains off 1:1, alarms on for safety. Discharge pending placement.    Problem: Adult Inpatient Plan of Care  Goal: Optimal Comfort and Wellbeing  Outcome: Progressing     Problem: Fall Injury Risk  Goal: Absence of Fall and Fall-Related Injury  Intervention: Promote Injury-Free Environment   Goal Outcome Evaluation:

## 2025-01-13 ENCOUNTER — APPOINTMENT (OUTPATIENT)
Dept: OCCUPATIONAL THERAPY | Facility: CLINIC | Age: 85
DRG: 178 | End: 2025-01-13
Payer: MEDICARE

## 2025-01-13 VITALS
OXYGEN SATURATION: 93 % | WEIGHT: 225.6 LBS | HEIGHT: 69 IN | DIASTOLIC BLOOD PRESSURE: 71 MMHG | BODY MASS INDEX: 33.41 KG/M2 | SYSTOLIC BLOOD PRESSURE: 137 MMHG | TEMPERATURE: 97.8 F | RESPIRATION RATE: 18 BRPM | HEART RATE: 73 BPM

## 2025-01-13 PROCEDURE — 97535 SELF CARE MNGMENT TRAINING: CPT | Mod: GO

## 2025-01-13 PROCEDURE — 250N000013 HC RX MED GY IP 250 OP 250 PS 637

## 2025-01-13 PROCEDURE — 99238 HOSP IP/OBS DSCHRG MGMT 30/<: CPT | Mod: GC

## 2025-01-13 PROCEDURE — 250N000011 HC RX IP 250 OP 636

## 2025-01-13 RX ORDER — POLYETHYLENE GLYCOL 3350 17 G/17G
17 POWDER, FOR SOLUTION ORAL ONCE
Status: COMPLETED | OUTPATIENT
Start: 2025-01-13 | End: 2025-01-13

## 2025-01-13 RX ORDER — POLYETHYLENE GLYCOL 3350 17 G/17G
17 POWDER, FOR SOLUTION ORAL DAILY PRN
Status: DISCONTINUED | OUTPATIENT
Start: 2025-01-13 | End: 2025-01-13 | Stop reason: HOSPADM

## 2025-01-13 RX ADMIN — METOPROLOL SUCCINATE 25 MG: 25 TABLET, EXTENDED RELEASE ORAL at 09:15

## 2025-01-13 RX ADMIN — ENOXAPARIN SODIUM 40 MG: 40 INJECTION SUBCUTANEOUS at 04:33

## 2025-01-13 RX ADMIN — ASPIRIN 81 MG: 81 TABLET, COATED ORAL at 09:15

## 2025-01-13 RX ADMIN — MEMANTINE 10 MG: 10 TABLET ORAL at 09:15

## 2025-01-13 RX ADMIN — FINASTERIDE 5 MG: 5 TABLET, FILM COATED ORAL at 09:15

## 2025-01-13 RX ADMIN — SENNOSIDES AND DOCUSATE SODIUM 2 TABLET: 50; 8.6 TABLET ORAL at 09:15

## 2025-01-13 RX ADMIN — POLYETHYLENE GLYCOL 3350 17 G: 17 POWDER, FOR SOLUTION ORAL at 11:28

## 2025-01-13 ASSESSMENT — ACTIVITIES OF DAILY LIVING (ADL)
ADLS_ACUITY_SCORE: 60
ADLS_ACUITY_SCORE: 63
ADLS_ACUITY_SCORE: 60
ADLS_ACUITY_SCORE: 63
ADLS_ACUITY_SCORE: 60
ADLS_ACUITY_SCORE: 63
ADLS_ACUITY_SCORE: 60
ADLS_ACUITY_SCORE: 60
ADLS_ACUITY_SCORE: 63

## 2025-01-13 NOTE — DISCHARGE SUMMARY
"[facility]  Discharge Summary - Medicine & Pediatrics       Date of Admission:  1/8/2025  Date of Discharge:  1/13/2025  Discharging Provider: Dr. Fabian Maldonado   Discharge Service: Hospitalist Service    Discharge Diagnoses   Recent fall secondary to viral infection     Clinically Significant Risk Factors     # Obesity: Estimated body mass index is 33.32 kg/m  as calculated from the following:    Height as of this encounter: 1.753 m (5' 9\").    Weight as of this encounter: 102.3 kg (225 lb 9.6 oz).       Follow-ups Needed After Discharge   Follow-up Appointments       Follow-up and recommended labs and tests       Follow up with primary care provider, Pallavi Bains, within 7 days for hospital follow- up.  No follow up labs or test are needed.                Unresulted Labs Ordered in the Past 30 Days of this Admission       Date and Time Order Name Status Description    1/9/2025  2:00 AM Blood Culture Peripheral Blood Preliminary     1/9/2025  2:00 AM Blood Culture Peripheral Blood Preliminary         These results will be followed up by Dr. Maldonado.     Discharge Disposition   Discharged to home with home aid, physical and occupational therapy.   Condition at discharge: Stable    Hospital Course   Patrick Green was admitted on 1/8/2025 for fall and new fever.  The patient has a history of Alzheimer's dementia, Afib s/p cardioversion and currently not on anticoagulation, NSTEMI s/p stents, CAD, BPH, CKD3.     The patient has alzheimer's dementia and is a poor historian. POA is the patent's oldest son, Brandon, but patient defers medical decisions to his middle son, Jose Antonio. The patient currently lives with his youngest son, Olaf. The patient has PTA home aids.   The patient presented to the ER for a fall after being found on the floor and back pain by a home aid. There, he presented with tachypnea with RR in 30s, satting in low 90s on room air, and BP at 155/73. VBG, CBC, and viral respiratory panel " were negative. Per patient's son, the patient rarely falls. Imaging on admission was negative for acute changes/fractures. Differential diagnosis was broad which included aspiration pneumonitis, viral respiratory infection, CHF exacerbation, UTI and COPD.  Chest CT did not show pneumonia or pulmonary edema.  Pro-Isai and WBC were normal.  Swallow evaluation was normal making aspiration pneumonitis less likely.  CHF exacerbation is less likely given normal BNP, EKG, and echocardiogram.  Urinalysis without signs of UTI.  Blood culture was negative. Fever and tachypnea resolved throughout hospital admission. He have been medically stable in over 48 hours. PT and OT has evaluated the patient and found that the patient is appropriate for TCU. However, the patient's family declined this and opted for at home physical and occupational therapy and home aides outside of his privately paid home aides. The patient was safely discharged home with his family.     Patient had a incidental finding on a CT chest with a 3 mm right upper lobe pulmonary nodule.  Patient has a remote history of smoking.  Per Fleischner recommendations, for a single nodule < 6mm, low respirations does not need a follow-up; high risk patients could have an optional follow-up in 12 months.    TO DO AT PCP IN OUTPATIENT SETTING:   - Follow up with PCP for post-hospitalization stay.     Consultations This Hospital Stay   CARE MANAGEMENT / SOCIAL WORK IP CONSULT  PHYSICAL THERAPY ADULT IP CONSULT  OCCUPATIONAL THERAPY ADULT IP CONSULT  SPEECH LANGUAGE PATH ADULT IP CONSULT    Code Status   No CPR- Do NOT Intubate       The patient was discussed with Dr. Dragan Bo.     Fabian Maldonado DO, PGY1  [address] [address]  Phone: [phone]  Fax: [phone]  ______________________________________________________________________    Physical Exam   Vital Signs: Temp: 97.8  F (36.6  C) Temp src:  Oral BP: 137/71 Pulse: 73   Resp: 18 SpO2: 93 % O2 Device: None (Room air)    Weight: 225 lbs 9.6 oz  Constitutional: Awake, alert, cooperative, no apparent distress, and appears stated age  Eyes: Lids and lashes normal, conjunctiva normal  ENT: Normocephalic, without obvious abnormality, atraumatic  Respiratory: No increased work of breathing, good air exchange, clear to auscultation bilaterally, no crackles or wheezing  Cardiovascular: Regular rate and rhythm, normal S1 and S2, no S3 or S4, and no murmur noted  GI: Normal bowel sounds, soft, non-distended, non-tender, no masses palpated, no hepatosplenomegaly  Skin: Multiple seborrheic keratosis on neck and face. No overlying erythema on presenting parts.   Musculoskeletal: There is no redness, warmth, or swelling of the joints.  Neurologic: Awake, alert, oriented to name, place and time.   Neuropsychiatric: General: normal, calm, and normal eye contact       Primary Care Physician   Pallavi Bains    Discharge Orders      Home Care Referral      Reason for your hospital stay    Recent fall and resolved fever     Follow-up and recommended labs and tests     Follow up with primary care provider, Pallavi Bains, within 7 days for hospital follow- up.  No follow up labs or test are needed.     Activity    Your activity upon discharge: activity as tolerated     Resume Home Care Services     Diet    Follow this diet upon discharge: Regular Diet Adult       Significant Results and Procedures   Results for orders placed or performed during the hospital encounter of 01/08/25   Head CT w/o contrast    Narrative    EXAM: CT HEAD W/O CONTRAST  LOCATION: [facility]  DATE: 1/8/2025    INDICATION: fall, thinners  COMPARISON: None.  TECHNIQUE: Routine CT Head without IV contrast. Multiplanar reformats. Dose reduction techniques were used.    FINDINGS:  INTRACRANIAL CONTENTS: No evidence of acute intracranial hemorrhage or mass effect. Scattered foci of  decreased attenuation within the cerebral hemispheric white matter which are nonspecific, though most commonly ascribed to chronic small vessel ischemic   disease. The ventricles and sulci are prominent consistent with moderate brain parenchymal volume loss. Gray-white matter differentiation is maintained. The basilar cisterns are patent.    VISUALIZED ORBITS/SINUSES/MASTOIDS: The globes are unremarkable. The partially imaged paranasal sinuses and mastoid air cells are unremarkable.     BONES/SOFT TISSUES: The visualized skull base and calvarium are unremarkable.      Impression    IMPRESSION:    1.  No evidence of acute intracranial hemorrhage or mass effect.  2.  Moderate nonspecific white matter changes.  3.  Moderate brain parenchymal volume loss.   Chest XR,  PA & LAT    Narrative    EXAM: XR CHEST 2 VIEWS  LOCATION: [facility]  DATE: 1/8/2025    INDICATION: weakness, dyspnea  COMPARISON: None.      Impression    IMPRESSION: Heart is normal in size. Minimal atelectasis at the left lung base. Lungs are otherwise clear.   CT Cervical Spine w/o Contrast    Narrative    EXAM: CT CERVICAL SPINE W/O CONTRAST  LOCATION: [facility]  DATE: 1/8/2025    INDICATION: fall  COMPARISON: None.  TECHNIQUE: Routine CT Cervical Spine without IV contrast. Multiplanar reformats. Dose reduction techniques were used.    FINDINGS:  No evidence of acute fracture or subluxation of the cervical spine by CT imaging. Straightening of the normal cervical lordosis. The vertebral bodies of the cervical spine otherwise have normal stature and alignment. Multilevel degenerative disc disease   of the cervical spine with with disc height loss, most pronounced at C4/C5 and C5/C6. No extraspinal abnormality.     Craniovertebral junction and C1-C2: The odontoid process is well approximated with the anterior body of C1 and well aligned between the lateral masses of C1.    C2-C3: No posterior disc  bulge or spinal canal narrowing. Uncovertebral joint disease and facet arthropathy with severe bilateral neural foraminal narrowing.     C3-C4: No posterior disc bulge or spinal canal narrowing. No neural foraminal narrowing.     C4-C5: No posterior disc bulge or spinal canal narrowing. Uncovertebral joint disease and facet arthropathy with severe bilateral neural foraminal narrowing.     C5-C6: No posterior disc bulge or spinal canal narrowing. Uncovertebral joint disease and facet arthropathy with severe bilateral neural foraminal narrowing.     C6-C7: No posterior disc bulge or spinal canal narrowing. No neural foraminal narrowing.     C7-T1: No posterior disc bulge or spinal canal narrowing. No neural foraminal narrowing.       Impression    IMPRESSION:  1.  No evidence of acute fracture or subluxation of the cervical spine by CT imaging.  2.  Degenerative cervical spondylosis as described above.   Chest CT w/o contrast    Narrative    EXAM: CT CHEST W/O CONTRAST  LOCATION: [facility]  DATE: 1/8/2025    INDICATION: dysnea, crackles bilat worsen on L  COMPARISON: Chest radiographs from 1/8/2025  TECHNIQUE: CT chest without IV contrast. Multiplanar reformats were obtained. Dose reduction techniques were used.  CONTRAST: None.    FINDINGS:   LUNGS AND PLEURA: Strands of atelectasis or scarring in the lingula. No acute airspace consolidation. There is a 3 mm right apical nodule on image 65 of series 5. No pleural effusion.    MEDIASTINUM/AXILLAE: Normal heart size. Extensive multivessel coronary artery disease with stents in place.    CORONARY ARTERY CALCIFICATION: Previous intervention (stents or CABG).    UPPER ABDOMEN: Small gallstones. Multiple hepatic hypodensities, incompletely characterized via noncontrast technique.    MUSCULOSKELETAL: Osteopenia. No acute osseous findings.      Impression    IMPRESSION:   1.  No evidence of pneumonia or pulmonary edema. There is some atelectasis or  scarring in the lingula.    2.  3 mm right upper lobe pulmonary nodule. Please see follow-up guidelines below.    3.  Severe coronary artery disease, status post stenting.    REFERENCE:  Guidelines for Management of Incidental Pulmonary Nodules Detected on CT Images: From the Fleischner Society 2017.   Guidelines apply to incidental nodules in patients who are 35 years or older.  Guidelines do not apply to lung cancer screening, patients with immunosuppression, or patients with known primary cancer.    SINGLE NODULE  Nodule size <6 mm  Low-risk patients: No follow-up needed.  High-risk patients: Optional follow-up at 12 months.     Echocardiogram Complete    Narrative    [id number]  [id number]  [id number]  [id number]^THERESE^[place] [address]     Name: JAYNE PARSON  MRN: [id number]  : 1940  Study Date: 2025 07:51 AM  Age: 84 yrs  Gender: Male  Patient Location: [facility]  Reason For Study: Dyspnea  Ordering Physician: JILLIAN SALEH  Performed By: SONIA     BSA: 2.2 m2  Height: 69 in  Weight: 230 lb  BP: 141/69 mmHg  ______________________________________________________________________________  Procedure  Echocardiogram with two-dimensional, color and spectral Doppler. Technically  difficult study. Compared to the prior study dated 3/5/2018 (report in [facility]) no significant change. No hemodynamically significant valvular  abnormalities on 2D or color flow imaging.  ______________________________________________________________________________  Interpretation Summary     LV is poorly seen without contrast. Grossly normal in size with probably  normal systolic function, EF 55-60%. Unable to evaluate regional wall motion  due to poor endocardial border definition.  No hemodynamically significant valvular abnormalities on 2D or color flow  imaging.  Compared to the prior study dated 3/5/2018 (report in [facility]) no  significant  change  ______________________________________________________________________________  Left Ventricle  The left ventricle is normal in size. Left ventricular diastolic function is  indeterminate. Diastolic Doppler findings (E/E' ratio and/or other parameters)  suggest left ventricular filling pressures are normal. LV is poorly seen  without contrast. Grossly normal in size with probably normal systolic  function, EF 55-60%. Unable to evaluate regional wall motion due to poor  endocardial border definition.     Right Ventricle  The right ventricle is not well visualized. The right ventricular systolic  function is normal.     Atria  The left atrium is mildly dilated. Right atrial size is normal. Atrial septum  not well seen.     Mitral Valve  Mitral valve leaflets appear normal. There is trace mitral regurgitation.  There is no mitral valve stenosis.     Tricuspid Valve  The tricuspid valve is not well visualized. There is physiologic tricuspid  regurgitation. Right ventricular systolic pressure could not be approximated  due to inadequate tricuspid regurgitation. There is no tricuspid stenosis.     Aortic Valve  There is mild trileaflet aortic sclerosis. No aortic regurgitation is present.  No aortic stenosis is present.     Pulmonic Valve  The pulmonic valve is not well visualized. There is no pulmonic valvular  regurgitation. There is no pulmonic valvular stenosis.     Vessels  The aorta root is normal. The thoracic aorta is normal. Inferior vena cava not  well visualized for estimation of right atrial pressure.     Pericardium  There is no pericardial effusion.     Rhythm  Sinus rhythm was noted.  ______________________________________________________________________________  MMode/2D Measurements & Calculations  IVSd: 0.87 cm  LVIDd: 3.6 cm  LVIDs: 2.5 cm  LVPWd: 1.2 cm  FS: 30.2 %  LV mass(C)d: 113.4 grams  LV mass(C)dI: 51.7 grams/m2  Ao root diam: 3.3 cm  LA dimension: 3.4 cm  asc Aorta Diam: 3.3 cm  LA/Ao:  1.0  LVOT diam: 2.0 cm  LVOT area: 3.3 cm2  Ao root diam index Ht(cm/m): 1.9  Ao root diam index BSA (cm/m2): 1.5  Asc Ao diam index BSA (cm/m2): 1.5  Asc Ao diam index Ht(cm/m): 1.9  EF Biplane: 57.5 %  LA Volume (BP): 59.8 ml     LA Volume Index (BP): 27.3 ml/m2  LA Volume Indexed (AL/bp): 32.4 ml/m2  RWT: 0.66  TAPSE: 2.5 cm     Time Measurements  MM HR: 59.0 BPM     Doppler Measurements & Calculations  MV E max nelson: 42.7 cm/sec  MV A max nelson: 71.3 cm/sec  MV E/A: 0.60  MV dec time: 0.37 sec  LV V1 max P.9 mmHg  LV V1 max: 111.1 cm/sec  LV V1 VTI: 21.3 cm  SV(LVOT): 70.1 ml  SI(LVOT): 32.0 ml/m2  PA acc time: 0.06 sec  E/E' av.9  Lateral E/e': 6.2  Medial E/e': 7.5  RV S Nelson: 10.7 cm/sec     ______________________________________________________________________________  Report approved by: Ida Millan MD on 2025 10:22 AM             Discharge Medications   Current Discharge Medication List        CONTINUE these medications which have NOT CHANGED    Details   acetaminophen (TYLENOL EXTRA STRENGTH) 500 MG tablet Take 500-1,000 mg by mouth every 6 hours as needed       aspirin (ASA) 81 MG EC tablet Take 1 tablet (81 mg) by mouth daily    Associated Diagnoses: Cellulitis of leg, left      atorvastatin (LIPITOR) 80 MG tablet Take 80 mg by mouth At Bedtime      DOCOSAHEXANOIC ACID/EPA (FISH OIL ORAL) [DOCOSAHEXANOIC ACID/EPA (FISH OIL ORAL)] Take 1,500 mg by mouth daily.      finasteride (PROSCAR) 5 mg tablet [FINASTERIDE (PROSCAR) 5 MG TABLET] Take 5 mg by mouth daily.       ibuprofen (ADVIL/MOTRIN) 200 MG tablet Take 600 mg by mouth every 8 hours as needed for pain (for back pain).      lidocaine (LIDODERM) 5 % patch Place onto the skin daily as needed for moderate pain (back pain). To prevent lidocaine toxicity, patient should be patch free for 12 hrs daily.      memantine (NAMENDA) 10 MG tablet Take 10 mg by mouth 2 times daily       metoprolol succinate (TOPROL-XL) 25 MG [METOPROLOL SUCCINATE  (TOPROL-XL) 25 MG] Take 25 mg by mouth daily.       nitroglycerin (NITROSTAT) 0.4 MG SL tablet [NITROGLYCERIN (NITROSTAT) 0.4 MG SL TABLET] Place 0.4 mg under the tongue.      tamsulosin (FLOMAX) 0.4 mg Cp24 [TAMSULOSIN (FLOMAX) 0.4 MG CP24] Take 0.4 mg by mouth daily after supper.            Allergies   Allergies   Allergen Reactions    Diatrizoate Other (See Comments) and Rash     Anaphylactic reaction during 5/17/14 coronary angiogram per patient   Other reaction(s): Hypotension  Anaphylactic reaction during 5/17/14 coronary angiogram per patient   Anaphylactic reaction during 5/17/14 coronary angiogram per patient       Iodinated Contrast Media Unknown     Per ED admit notes: Son stated unknown allergic reaction in 2014.     Simvastatin Other (See Comments)     Muscle weakness  Other reaction(s): Muscle Weakness    Zocor [Simvastatin]

## 2025-01-13 NOTE — DISCHARGE INSTRUCTIONS
[facility] [phone] has accepted you for homecare services (Physical Therapy, Occupational Therapy, Home Health Aide).  They will call you within 48 hours to schedule a start of care visit.

## 2025-01-13 NOTE — PLAN OF CARE
Pt is alert and oriented to self, can  make needs known, assist of 1 with walker and gait belt. Vitals signs are stable on room air. Pt is tolerating regular diet. Saline locked. Pt is voiding appropriately. PRN Seroquel given. Discharge to TCU pending   Kayla Odonnell RN  January 12, 2025, 7:40 PM     Problem: Pain Acute  Goal: Optimal Pain Control and Function  Outcome: Progressing  Intervention: Optimize Psychosocial Wellbeing  Flowsheets (Taken 1/12/2025 1937)  Diversional Activities: television  Intervention: Prevent or Manage Pain  Recent Flowsheet Documentation  Taken 1/12/2025 1937 by aKyla Odonnell RN  Medication Review/Management:   medications reviewed   high-risk medications identified  Taken 1/12/2025 1817 by Kayla Odonnell RN  Medication Review/Management: medications reviewed     Problem: Adult Inpatient Plan of Care  Goal: Absence of Hospital-Acquired Illness or Injury  Intervention: Identify and Manage Fall Risk  Recent Flowsheet Documentation  Taken 1/12/2025 1937 by Kayla Odonnell RN  Safety Promotion/Fall Prevention:   room door open   supervised activity   safety round/check completed  Taken 1/12/2025 1817 by Kayla Odonnell RN  Safety Promotion/Fall Prevention:   room door open   supervised activity   safety round/check completed  Intervention: Prevent Skin Injury  Recent Flowsheet Documentation  Taken 1/12/2025 1817 by Kayla Odonnell RN  Skin Protection:   adhesive use limited   tubing/devices free from skin contact  Intervention: Prevent and Manage VTE (Venous Thromboembolism) Risk  Recent Flowsheet Documentation  Taken 1/12/2025 1817 by Kayla Odonnell RN  VTE Prevention/Management: SCDs off (sequential compression devices)  Intervention: Prevent Infection  Recent Flowsheet Documentation  Taken 1/12/2025 1817 by Kayla Odonnell RN  Infection Prevention:   hand hygiene promoted   rest/sleep promoted   single patient room provided     Problem: Fall Injury Risk  Goal: Absence of Fall and Fall-Related  Injury  Intervention: Identify and Manage Contributors  Flowsheets  Taken 1/12/2025 1937  Medication Review/Management:   medications reviewed   high-risk medications identified  Taken 1/12/2025 1817  Medication Review/Management: medications reviewed  Intervention: Promote Injury-Free Environment  Flowsheets  Taken 1/12/2025 1937  Safety Promotion/Fall Prevention:   room door open   supervised activity   safety round/check completed  Taken 1/12/2025 1817  Safety Promotion/Fall Prevention:   room door open   supervised activity   safety round/check completed   Goal Outcome Evaluation:  HLM Admission: 8- Walk 250 feet or more  HLM Daily8- Walk 250 feet or more

## 2025-01-13 NOTE — PROGRESS NOTES
Care Management Discharge Note    Discharge Date: 01/13/2025       Discharge Disposition: Home, Home Care    Discharge Services: None    Discharge DME: None    Discharge Transportation:  Family    Private pay costs discussed: Not applicable    Does the patient's insurance plan have a 3 day qualifying hospital stay waiver?  No    PAS Confirmation Code: [id number]  Patient/family educated on Medicare website which has current facility and service quality ratings: yes    Education Provided on the Discharge Plan: Yes AVS per bedside nurse   Persons Notified of Discharge Plans:  patient, son Jose Antonio, daughter in law Krishna  Patient/Family in Agreement with the Plan: yes    Handoff Referral Completed: No, handoff not indicated or clinically appropriate    Additional Information:    Pt/family declined TCU.      Pt discharging to home with homecare; family transport.    [facility] - accepted this pt for PT, OT, HHA.  Notified HC agency of pt's discharge today.  Discharge orders sent to HC agency via Epic.     See also CM progress note 1/13.    No further care management intervention anticipated at this time.  Please re-consult if further needs arise.  Care management signing off.      Sophia Sanabria RN

## 2025-01-13 NOTE — PLAN OF CARE
Goal Outcome Evaluation:    Patient is alert to self only. VSS on RA. Afebrile. Denies pain, sob, dizziness, nor n/v. Ambulates A1 with walker and gait belt. Voiding appropriately. No significant event overnight. Call light within reach. Pending discharge to TCU.     Problem: Adult Inpatient Plan of Care  Goal: Absence of Hospital-Acquired Illness or Injury  Intervention: Identify and Manage Fall Risk  Recent Flowsheet Documentation  Taken 1/13/2025 0120 by Cordelia Williamson RN  Safety Promotion/Fall Prevention: safety round/check completed  Taken 1/12/2025 2318 by Cordelia Williamson RN  Safety Promotion/Fall Prevention:   room door open   supervised activity   safety round/check completed  Intervention: Prevent Skin Injury  Recent Flowsheet Documentation  Taken 1/13/2025 0120 by Cordelia Williamson RN  Body Position:   position changed independently   turned   left  Taken 1/12/2025 2318 by Cordelia Williamson RN  Body Position: position changed independently  Skin Protection:   adhesive use limited   tubing/devices free from skin contact  Intervention: Prevent and Manage VTE (Venous Thromboembolism) Risk  Recent Flowsheet Documentation  Taken 1/12/2025 2318 by Cordelia Williamson RN  VTE Prevention/Management: SCDs off (sequential compression devices)  Intervention: Prevent Infection  Recent Flowsheet Documentation  Taken 1/12/2025 2318 by Cordelia Williamson RN  Infection Prevention:   hand hygiene promoted   rest/sleep promoted   single patient room provided  Goal: Optimal Comfort and Wellbeing  Outcome: Progressing     Problem: Pain Acute  Goal: Optimal Pain Control and Function  Outcome: Progressing     Problem: Fall Injury Risk  Goal: Absence of Fall and Fall-Related Injury  Outcome: Progressing  Intervention: Promote Injury-Free Environment  Recent Flowsheet Documentation  Taken 1/13/2025 0120 by Cordelia Williamson RN  Safety Promotion/Fall Prevention: safety round/check completed  Taken 1/12/2025 2318 by Cordelia Williamson RN  Safety Promotion/Fall  Prevention:   room door open   supervised activity   safety round/check completed     Problem: Gas Exchange Impaired  Goal: Optimal Gas Exchange  Outcome: Progressing  Intervention: Optimize Oxygenation and Ventilation  Recent Flowsheet Documentation  Taken 1/13/2025 0120 by Cordelia Williamson RN  Head of Bed ([facility]) Positioning: HOB at 20-30 degrees  Taken 1/12/2025 2318 by Cordelia Williamson RN  Head of Bed ([facility]) Positioning: HOB at 20-30 degrees

## 2025-01-13 NOTE — PROGRESS NOTES
Occupational Therapy Discharge Summary    Reason for therapy discharge:    Discharged to home with home therapy.    Progress towards therapy goal(s). See goals on Care Plan in [facility] electronic health record for goal details.  Goals not met.  Barriers to achieving goals:   discharge from facility.    Therapy recommendation(s):    Continued therapy is recommended.  Rationale/Recommendations:  Home OT for  ADLs, cognition, home safety and caregiver training.

## 2025-01-13 NOTE — PROGRESS NOTES
"Care Management Follow Up    Length of Stay (days): 4    Expected Discharge Date: 01/14/2025     Concerns to be Addressed: discharge planning  home with homecare  Patient plan of care discussed at interdisciplinary rounds: Yes    Anticipated Discharge Disposition: Home, Home Care      Anticipated Discharge Services: None  Anticipated Discharge DME: None    Patient/family educated on Medicare website which has current facility and service quality ratings: yes  Education Provided on the Discharge Plan: Yes  Patient/Family in Agreement with the Plan: yes    Referrals Placed by CM/SW: Homecare  Private pay costs discussed: Not applicable    Discussed  Partnership in Safe Discharge Planning  document with patient/family: Yes:      Handoff Completed: No, handoff not indicated or clinically appropriate    Additional Information:    Seeking TCU, but no accepting TCU's at this time.    [facility] - Katie reviewing for shared room.  [facility] - declined  [facility] - declined  [facility] - declined  [facility] - declined  [facility] - declined  [facility] - declined  [facility] - Can review but also COVID in the building.    11:10 AM  Son Jose Antonio and DIANA Keller - Update given to family.  Hemal report: Pt resides with ji Babin.  Olaf works daytime hours from 11:30-21:00/21:30pm.  Pt has private pay caregivers through [employer] (6 hours/day x 4 days/week); they visit pt during Olaf's work hours.  Jose Antonio is retired and lives 3 blocks away; Jose Antonio visits pt as needed to assist.  DIANA Keller is still employed.  Jose Antonio stated \"it's not quite 24 hours, but it's as close as humanly possible.\"    Hemal decline TCU for patient.  Decline shared TCU room at [facility]; decline [facility].    Jose Antonio and Krishna prefer a discharge to home with homecare rather than to a non-preferred TCU.  Hemal are accepting of Homecare PT, OT, HHA through any accepting HC agency.  They " will resume private pay caregivers via [employer] and will coordinate an increase in private pay caregivers if needed.  Krishna states she will arrange family transport to home today.  Provided nurse's station phone number to Krishna.    11:38 AM   [facility] - accepted for homecare PT, OT, HHA.    Update given to bedside RN, Charge RN, Dr. Fabian Maldonado of [facility] team.    CM will continue to follow.     Sophia Sanabria RN

## 2025-01-13 NOTE — PROGRESS NOTES
Physical Therapy Discharge Summary    Reason for therapy discharge:    Discharged to home with home therapy.    Progress towards therapy goal(s). See goals on Care Plan in [facility] electronic health record for goal details.  Goals partially met.  Barriers to achieving goals:   limited tolerance for therapy.    Therapy recommendation(s):    Continued therapy is recommended.  Rationale/Recommendations:   .Patient would benefit from further rehab to improve functional mobility and safety

## 2025-01-14 LAB
BACTERIA BLD CULT: NO GROWTH
BACTERIA BLD CULT: NO GROWTH

## 2025-01-15 ENCOUNTER — PATIENT OUTREACH (OUTPATIENT)
Dept: CARE COORDINATION | Facility: CLINIC | Age: 85
End: 2025-01-15
Payer: COMMERCIAL

## 2025-01-15 NOTE — PROGRESS NOTES
[facility] Contact  [facility]/Voicemail     Clinical Data: Post-Discharge Outreach     Outreach attempted x 2. No answer, unable to lvm.Incase Pt called to check if they have a questions/concerns and/or to schedule an appt with an [facility] provider, if they do not have a PCP.      Plan:  [facility] will do no further outreaches at this time.       STEFANY Jacinto  [phone]  [facility]